# Patient Record
Sex: FEMALE | Race: WHITE | Employment: UNEMPLOYED | URBAN - METROPOLITAN AREA
[De-identification: names, ages, dates, MRNs, and addresses within clinical notes are randomized per-mention and may not be internally consistent; named-entity substitution may affect disease eponyms.]

---

## 2017-05-06 DIAGNOSIS — N18.30 CKD (CHRONIC KIDNEY DISEASE) STAGE 3, GFR 30-59 ML/MIN (HCC): ICD-10-CM

## 2017-05-06 DIAGNOSIS — I10 ESSENTIAL HYPERTENSION: ICD-10-CM

## 2017-05-06 LAB
ALBUMIN SERPL-MCNC: 4.2 G/DL (ref 3.9–4.9)
ALP BLD-CCNC: 134 U/L (ref 40–130)
ALT SERPL-CCNC: 29 U/L (ref 0–33)
ANION GAP SERPL CALCULATED.3IONS-SCNC: 14 MEQ/L (ref 7–13)
AST SERPL-CCNC: 44 U/L (ref 0–35)
BILIRUB SERPL-MCNC: 0.6 MG/DL (ref 0–1.2)
BUN BLDV-MCNC: 21 MG/DL (ref 8–23)
CALCIUM SERPL-MCNC: 10.2 MG/DL (ref 8.6–10.2)
CHLORIDE BLD-SCNC: 96 MEQ/L (ref 98–107)
CHOLESTEROL, TOTAL: 193 MG/DL (ref 0–199)
CO2: 26 MEQ/L (ref 22–29)
CREAT SERPL-MCNC: 1.15 MG/DL (ref 0.5–0.9)
GFR AFRICAN AMERICAN: 52.9
GFR NON-AFRICAN AMERICAN: 43.7
GLOBULIN: 3.1 G/DL (ref 2.3–3.5)
GLUCOSE BLD-MCNC: 82 MG/DL (ref 74–109)
HDLC SERPL-MCNC: 83 MG/DL (ref 40–59)
LDL CHOLESTEROL CALCULATED: 95 MG/DL (ref 0–129)
POTASSIUM SERPL-SCNC: 4.6 MEQ/L (ref 3.5–5.1)
SODIUM BLD-SCNC: 136 MEQ/L (ref 132–144)
TOTAL PROTEIN: 7.3 G/DL (ref 6.4–8.1)
TRIGL SERPL-MCNC: 77 MG/DL (ref 0–200)

## 2017-05-16 ENCOUNTER — OFFICE VISIT (OUTPATIENT)
Dept: FAMILY MEDICINE CLINIC | Age: 82
End: 2017-05-16

## 2017-05-16 VITALS
BODY MASS INDEX: 18.01 KG/M2 | DIASTOLIC BLOOD PRESSURE: 82 MMHG | WEIGHT: 85.8 LBS | HEART RATE: 72 BPM | OXYGEN SATURATION: 97 % | RESPIRATION RATE: 16 BRPM | SYSTOLIC BLOOD PRESSURE: 174 MMHG | TEMPERATURE: 96.6 F | HEIGHT: 58 IN

## 2017-05-16 DIAGNOSIS — R74.8 ELEVATED LIVER ENZYMES: ICD-10-CM

## 2017-05-16 DIAGNOSIS — I10 ESSENTIAL HYPERTENSION: Primary | ICD-10-CM

## 2017-05-16 DIAGNOSIS — N18.30 CKD (CHRONIC KIDNEY DISEASE) STAGE 3, GFR 30-59 ML/MIN (HCC): ICD-10-CM

## 2017-05-16 PROCEDURE — 99214 OFFICE O/P EST MOD 30 MIN: CPT | Performed by: FAMILY MEDICINE

## 2017-05-16 RX ORDER — MOEXIPRIL HCL 15 MG
15 TABLET ORAL NIGHTLY
Qty: 30 TABLET | Refills: 1 | Status: SHIPPED | OUTPATIENT
Start: 2017-05-16 | End: 2017-07-31 | Stop reason: ALTCHOICE

## 2017-05-16 RX ORDER — KETOCONAZOLE 20 MG/G
CREAM TOPICAL
Refills: 1 | Status: ON HOLD | COMMUNITY
Start: 2017-05-02 | End: 2020-01-01 | Stop reason: HOSPADM

## 2017-05-30 ENCOUNTER — HOSPITAL ENCOUNTER (OUTPATIENT)
Dept: ULTRASOUND IMAGING | Age: 82
Discharge: HOME | End: 2017-05-30
Payer: MEDICARE

## 2017-05-30 DIAGNOSIS — R74.8 ELEVATED LIVER ENZYMES: ICD-10-CM

## 2017-05-30 PROCEDURE — 76705 ECHO EXAM OF ABDOMEN: CPT

## 2017-06-12 DIAGNOSIS — I10 ESSENTIAL HYPERTENSION: ICD-10-CM

## 2017-06-12 RX ORDER — MOEXIPRIL HYDROCHLORIDE AND HYDROCHLOROTHIAZIDE 15; 12.5 MG/1; MG/1
TABLET, FILM COATED ORAL
Qty: 30 TABLET | Refills: 2 | Status: SHIPPED | OUTPATIENT
Start: 2017-06-12 | End: 2017-07-03

## 2017-06-27 DIAGNOSIS — R74.8 ELEVATED LIVER ENZYMES: ICD-10-CM

## 2017-06-27 DIAGNOSIS — N18.30 CKD (CHRONIC KIDNEY DISEASE) STAGE 3, GFR 30-59 ML/MIN (HCC): ICD-10-CM

## 2017-06-27 LAB
ANION GAP SERPL CALCULATED.3IONS-SCNC: 16 MEQ/L (ref 7–13)
BUN BLDV-MCNC: 33 MG/DL (ref 8–23)
CALCIUM SERPL-MCNC: 10 MG/DL (ref 8.6–10.2)
CHLORIDE BLD-SCNC: 101 MEQ/L (ref 98–107)
CO2: 26 MEQ/L (ref 22–29)
CREAT SERPL-MCNC: 1.22 MG/DL (ref 0.5–0.9)
FERRITIN: 180.6 NG/ML (ref 13–150)
GFR AFRICAN AMERICAN: 49.4
GFR NON-AFRICAN AMERICAN: 40.8
GLUCOSE BLD-MCNC: 86 MG/DL (ref 74–109)
HBV SURFACE AB TITR SER: NORMAL MIU/ML
HEPATITIS B SURFACE ANTIGEN INTERPRETATION: NORMAL
HEPATITIS C ANTIBODY INTERPRETATION: NORMAL
POTASSIUM SERPL-SCNC: 4.3 MEQ/L (ref 3.5–5.1)
SODIUM BLD-SCNC: 143 MEQ/L (ref 132–144)

## 2017-06-29 LAB — HAV AB SERPL IA-ACNC: NEGATIVE

## 2017-07-03 ENCOUNTER — OFFICE VISIT (OUTPATIENT)
Dept: FAMILY MEDICINE CLINIC | Age: 82
End: 2017-07-03

## 2017-07-03 VITALS
SYSTOLIC BLOOD PRESSURE: 180 MMHG | WEIGHT: 84.3 LBS | BODY MASS INDEX: 17.7 KG/M2 | RESPIRATION RATE: 15 BRPM | HEART RATE: 76 BPM | TEMPERATURE: 97.8 F | OXYGEN SATURATION: 98 % | HEIGHT: 58 IN | DIASTOLIC BLOOD PRESSURE: 82 MMHG

## 2017-07-03 DIAGNOSIS — R05.9 COUGH: ICD-10-CM

## 2017-07-03 DIAGNOSIS — R74.8 ELEVATED LIVER ENZYMES: ICD-10-CM

## 2017-07-03 DIAGNOSIS — I10 ESSENTIAL HYPERTENSION: Primary | ICD-10-CM

## 2017-07-03 PROCEDURE — 99213 OFFICE O/P EST LOW 20 MIN: CPT | Performed by: FAMILY MEDICINE

## 2017-07-03 RX ORDER — AMLODIPINE BESYLATE 5 MG/1
5 TABLET ORAL DAILY
Qty: 30 TABLET | Refills: 1 | Status: SHIPPED | OUTPATIENT
Start: 2017-07-03 | End: 2017-07-31 | Stop reason: SDUPTHER

## 2017-07-03 ASSESSMENT — ENCOUNTER SYMPTOMS
SHORTNESS OF BREATH: 0
ABDOMINAL PAIN: 0

## 2017-07-31 ENCOUNTER — OFFICE VISIT (OUTPATIENT)
Dept: FAMILY MEDICINE CLINIC | Age: 82
End: 2017-07-31

## 2017-07-31 VITALS
HEART RATE: 87 BPM | HEIGHT: 58 IN | OXYGEN SATURATION: 98 % | BODY MASS INDEX: 17.67 KG/M2 | TEMPERATURE: 97.5 F | WEIGHT: 84.2 LBS | SYSTOLIC BLOOD PRESSURE: 148 MMHG | DIASTOLIC BLOOD PRESSURE: 78 MMHG | RESPIRATION RATE: 16 BRPM

## 2017-07-31 DIAGNOSIS — I10 ESSENTIAL HYPERTENSION: Primary | ICD-10-CM

## 2017-07-31 PROCEDURE — 99213 OFFICE O/P EST LOW 20 MIN: CPT | Performed by: FAMILY MEDICINE

## 2017-07-31 RX ORDER — AMLODIPINE BESYLATE 5 MG/1
5 TABLET ORAL DAILY
Qty: 90 TABLET | Refills: 0 | Status: SHIPPED | OUTPATIENT
Start: 2017-07-31 | End: 2017-10-24 | Stop reason: DRUGHIGH

## 2017-07-31 RX ORDER — ACETAMINOPHEN 500 MG
500 TABLET ORAL EVERY 6 HOURS PRN
Status: ON HOLD | COMMUNITY
End: 2020-01-01 | Stop reason: HOSPADM

## 2017-07-31 ASSESSMENT — ENCOUNTER SYMPTOMS
ABDOMINAL PAIN: 0
SHORTNESS OF BREATH: 0

## 2017-08-22 ENCOUNTER — OFFICE VISIT (OUTPATIENT)
Dept: FAMILY MEDICINE CLINIC | Age: 82
End: 2017-08-22

## 2017-08-22 VITALS
TEMPERATURE: 98 F | HEART RATE: 92 BPM | RESPIRATION RATE: 14 BRPM | SYSTOLIC BLOOD PRESSURE: 144 MMHG | DIASTOLIC BLOOD PRESSURE: 78 MMHG | WEIGHT: 84.7 LBS | HEIGHT: 58 IN | OXYGEN SATURATION: 95 % | BODY MASS INDEX: 17.78 KG/M2

## 2017-08-22 DIAGNOSIS — I10 ESSENTIAL HYPERTENSION: Primary | ICD-10-CM

## 2017-08-22 PROCEDURE — 99212 OFFICE O/P EST SF 10 MIN: CPT | Performed by: FAMILY MEDICINE

## 2017-08-22 ASSESSMENT — ENCOUNTER SYMPTOMS: SHORTNESS OF BREATH: 0

## 2017-09-29 ENCOUNTER — OFFICE VISIT (OUTPATIENT)
Dept: FAMILY MEDICINE CLINIC | Age: 82
End: 2017-09-29

## 2017-09-29 VITALS
RESPIRATION RATE: 15 BRPM | DIASTOLIC BLOOD PRESSURE: 68 MMHG | HEART RATE: 75 BPM | TEMPERATURE: 96.3 F | BODY MASS INDEX: 17.97 KG/M2 | SYSTOLIC BLOOD PRESSURE: 128 MMHG | OXYGEN SATURATION: 96 % | HEIGHT: 58 IN | WEIGHT: 85.6 LBS

## 2017-09-29 DIAGNOSIS — I10 ESSENTIAL HYPERTENSION: Primary | ICD-10-CM

## 2017-09-29 DIAGNOSIS — R35.0 URINARY FREQUENCY: ICD-10-CM

## 2017-09-29 LAB
BILIRUBIN, POC: ABNORMAL
BLOOD URINE, POC: 10
CLARITY, POC: CLEAR
COLOR, POC: YELLOW
GLUCOSE URINE, POC: ABNORMAL
KETONES, POC: ABNORMAL
LEUKOCYTE EST, POC: ABNORMAL
NITRITE, POC: ABNORMAL
PH, POC: 5
PROTEIN, POC: 3
SPECIFIC GRAVITY, POC: 1030
UROBILINOGEN, POC: 3.5

## 2017-09-29 PROCEDURE — 81003 URINALYSIS AUTO W/O SCOPE: CPT | Performed by: FAMILY MEDICINE

## 2017-09-29 PROCEDURE — 99213 OFFICE O/P EST LOW 20 MIN: CPT | Performed by: FAMILY MEDICINE

## 2017-09-29 RX ORDER — METOPROLOL SUCCINATE 25 MG/1
25 TABLET, EXTENDED RELEASE ORAL DAILY
Qty: 90 TABLET | Refills: 0 | Status: SHIPPED | OUTPATIENT
Start: 2017-09-29 | End: 2017-12-10 | Stop reason: SDUPTHER

## 2017-09-29 RX ORDER — AMLODIPINE BESYLATE 10 MG/1
10 TABLET ORAL DAILY
Qty: 90 TABLET | Refills: 1 | Status: CANCELLED | OUTPATIENT
Start: 2017-09-29

## 2017-09-29 RX ORDER — AMLODIPINE BESYLATE 5 MG/1
5 TABLET ORAL DAILY
Qty: 90 TABLET | Refills: 1 | Status: SHIPPED | OUTPATIENT
Start: 2017-09-29 | End: 2018-03-28 | Stop reason: SDUPTHER

## 2017-09-29 RX ORDER — NITROFURANTOIN 25; 75 MG/1; MG/1
100 CAPSULE ORAL 2 TIMES DAILY
Qty: 14 CAPSULE | Refills: 0 | Status: SHIPPED | OUTPATIENT
Start: 2017-09-29 | End: 2017-10-06

## 2017-09-29 ASSESSMENT — ENCOUNTER SYMPTOMS: ABDOMINAL PAIN: 0

## 2017-10-01 LAB — URINE CULTURE, ROUTINE: NORMAL

## 2017-10-02 ENCOUNTER — TELEPHONE (OUTPATIENT)
Dept: FAMILY MEDICINE CLINIC | Age: 82
End: 2017-10-02

## 2017-10-02 NOTE — TELEPHONE ENCOUNTER
Patient states Friday she was given a script of nitrofurantion . She states she thinks she is having a reaction from it because  has never had this reaction before.  She states she is having coughing spells that hurts, and she does ot think its normal. She wants to know if its normal

## 2017-10-09 DIAGNOSIS — R31.9 BLOOD IN URINE: Primary | ICD-10-CM

## 2017-10-23 ENCOUNTER — OFFICE VISIT (OUTPATIENT)
Dept: UROLOGY | Age: 82
End: 2017-10-23

## 2017-10-23 VITALS
DIASTOLIC BLOOD PRESSURE: 68 MMHG | HEART RATE: 64 BPM | HEIGHT: 59 IN | BODY MASS INDEX: 16.93 KG/M2 | WEIGHT: 84 LBS | SYSTOLIC BLOOD PRESSURE: 130 MMHG

## 2017-10-23 DIAGNOSIS — R31.9 HEMATURIA, UNSPECIFIED TYPE: Primary | ICD-10-CM

## 2017-10-23 DIAGNOSIS — R31.29 MICROHEMATURIA: ICD-10-CM

## 2017-10-23 LAB
BILIRUBIN, POC: ABNORMAL
BLOOD URINE, POC: ABNORMAL
CLARITY, POC: CLEAR
COLOR, POC: YELLOW
GLUCOSE URINE, POC: ABNORMAL
KETONES, POC: ABNORMAL
LEUKOCYTE EST, POC: ABNORMAL
NITRITE, POC: ABNORMAL
PH, POC: 5
PROTEIN, POC: 30
SPECIFIC GRAVITY, POC: 1.02
UROBILINOGEN, POC: 0.2

## 2017-10-23 PROCEDURE — 99203 OFFICE O/P NEW LOW 30 MIN: CPT | Performed by: UROLOGY

## 2017-10-23 PROCEDURE — 81003 URINALYSIS AUTO W/O SCOPE: CPT | Performed by: UROLOGY

## 2017-10-23 ASSESSMENT — ENCOUNTER SYMPTOMS
GASTROINTESTINAL NEGATIVE: 1
RESPIRATORY NEGATIVE: 1

## 2017-10-23 NOTE — PROGRESS NOTES
 ketoconazole (NIZORAL) 2 % cream APPLY TO AFFECTED AREA AS DIRECTED  1    diclofenac sodium 1 % GEL Apply 2 g topically 4 times daily To affected knee      triamcinolone (KENALOG) 0.1 % cream APPLY DAILY FRIDAY TO SUNDAY TO LEG DERMATITIS  2    Emollient (CERAVE) CREA Apply topically 2 times daily      hydroxychloroquine (PLAQUENIL) 200 MG tablet   5    aspirin 81 MG EC tablet Take 81 mg by mouth daily.  Calcium Carbonate-Vitamin D (CALCIUM + D PO) Take  by mouth every other day.  Wheat Dextrin (BENEFIBER PO) Take  by mouth daily. 1 tsp       amLODIPine (NORVASC) 5 MG tablet Take 1 tablet by mouth daily 90 tablet 1     No current facility-administered medications for this visit. Norvasc [amlodipine] and Univasc [moexipril]  reviewed      Review of Systems   Constitutional: Negative. HENT: Negative. Eyes: Positive for visual disturbance. Respiratory: Negative. Cardiovascular: Negative. Gastrointestinal: Negative. Endocrine: Negative. Genitourinary: Negative for decreased urine volume, difficulty urinating, dysuria, enuresis, flank pain, frequency, genital sores, pelvic pain and urgency. Musculoskeletal: Negative. Skin: Negative. Neurological: Negative. Hematological: Negative. Does not bruise/bleed easily. Psychiatric/Behavioral: Negative. Objective:   Physical Exam   Constitutional: She appears well-developed and well-nourished. HENT:   Head: Normocephalic and atraumatic. Eyes: Conjunctivae are normal.   Neck: Neck supple. No tracheal deviation present. No thyromegaly present. Cardiovascular: Normal rate, regular rhythm and normal heart sounds. Pulmonary/Chest: Effort normal and breath sounds normal. She has no wheezes. She has no rales. Abdominal: Soft. Bowel sounds are normal. She exhibits no distension and no mass. There is no hepatosplenomegaly. There is no tenderness. There is no rebound, no guarding and no CVA tenderness.  No hernia. Neurological: She is alert. Skin: Skin is warm. Psychiatric: She has a normal mood and affect. Her behavior is normal.       10/1/2017  7:38 AM - Dilip, Chpo Incoming Lab Results From [facility]     Component Results     Component Collected Lab   Urine Culture, Routine 09/29/2017  2:32 PM [address] [facility]   No growth 24 hours    Testing Performed By     Lab - [address] Name Director Address Valid Date Range   148[address] [facility] Nubia Melton MD [address] [place] [address] 08/30/17 1247-Present   Narrative     ORDER#: [id number]                          ORDERED BY: Angela Pike  SOURCE: Urine Clean Catch                  COLLECTED:  09/29/17 14:32  [facility] AT [facility]. :                      RECEIVED :  09/29/17 20:11   Lab and Collection     9/29/2017  1:28 PM - Graciela Harry MA     Component Results     Component Collected Lab   Color, UA 09/29/2017  1:28 PM Unknown   yellow    Clarity, UA 09/29/2017  1:28 PM Unknown   clear    Glucose, UA POC 09/29/2017  1:28 PM Unknown   n    Bilirubin, UA 09/29/2017  1:28 PM Unknown   n    Ketones, UA 09/29/2017  1:28 PM Unknown   n    Spec Grav, UA 09/29/2017  1:28 PM Unknown   1,030    Blood, UA POC 09/29/2017  1:28 PM Unknown   10    pH, UA 09/29/2017  1:28 PM Unknown   5    Protein, UA POC 09/29/2017  1:28 PM Unknown   3    Urobilinogen, UA 09/29/2017  1:28 PM Unknown   3.5    Leukocytes, UA 09/29/2017  1:28 PM Unknown   n    Nitrite, UA 09/29/2017  1:28 PM Unknown   n      6/27/2017  5:58 PM - Dilip, Chpo Incoming Lab Results From [facility]     Component Results     Component Value Ref Range & Units Status Collected Lab   Sodium 143  132 - 144 mEq/L Final 06/27/2017 11:18 AM [facility]   Potassium 4.3  3.5 - 5.1 mEq/L Final 06/27/2017 11:18 AM [facility]   Chloride 101  98 - 107 mEq/L Final 06/27/2017 11:18 AM [facility]   CO2 26  22 - 29 mEq/L Final 06/27/2017 11:18 AM [facility]   Anion Gap 16   7 - 13 mEq/L Final 06/27/2017 11:18 AM [facility]   Glucose 86  74 - 109 mg/dL Final 06/27/2017 11:18 AM [facility]   BUN 33   8 - 23 mg/dL Final 06/27/2017 11:18 AM [facility]   CREATININE 1.22   0.50 - 0.90 mg/dL Final 06/27/2017 11:18 AM [facility]   GFR Non- 40.8   >60 Final 06/27/2017 11:18 AM [facility]   >60 mL/min/1.73m2 EGFR, calc. for ages 25 and older using the   MDRD formula (not corrected for weight), is valid for stable   renal function. GFR  49.4   >60 Final 06/27/2017 11:18 AM [facility]   >60 mL/min/1.73m2 EGFR, calc. for ages 25 and older using the   MDRD formula (not corrected for weight), is valid for stable   renal function. Calcium 10.0  8.6        10/23/2017  1:40 PM - Denver Stump     Component Results     Component Collected Lab   Color, UA 10/23/2017  1:40 PM Unknown   yellow    Clarity, UA 10/23/2017  1:40 PM Unknown   clear    Glucose, UA POC 10/23/2017  1:40 PM Unknown   neg    Bilirubin, UA 10/23/2017  1:40 PM Unknown   neg    Ketones, UA 10/23/2017  1:40 PM Unknown   neg    Spec Grav, UA 10/23/2017  1:40 PM Unknown   1.025    Blood, UA POC 10/23/2017  1:40 PM Unknown   trace-lysed    pH, UA 10/23/2017  1:40 PM Unknown   5.0    Protein, UA POC 10/23/2017  1:40 PM Unknown   30    Urobilinogen, UA 10/23/2017  1:40 PM Unknown   0.2    Leukocytes, UA 10/23/2017  1:40 PM Unknown   neg    Nitrite, UA 10/23/2017  1:40 PM Unknown   neg        Assessment: This is a 81 yo female with h/o HTN, OA, CKD (last Creat 1.22) with persistent microhematuria. I discussed a hematuria evaluation with the patient and her daughter today. She is not a candidate for a CT Urogram given elevated Creat but they would prefer a Renal U/S and office cystoscopy and understand the limitations of the U/S when is comes to upper tract evaluation. I think this is reasonable given her age and not having gross hematuria. They will agree to further imaging if the U/S suggests the need for this.  They want to wait

## 2017-10-24 ENCOUNTER — OFFICE VISIT (OUTPATIENT)
Dept: FAMILY MEDICINE CLINIC | Age: 82
End: 2017-10-24

## 2017-10-24 VITALS
SYSTOLIC BLOOD PRESSURE: 136 MMHG | BODY MASS INDEX: 17.71 KG/M2 | WEIGHT: 84.4 LBS | OXYGEN SATURATION: 97 % | DIASTOLIC BLOOD PRESSURE: 78 MMHG | HEART RATE: 62 BPM | HEIGHT: 58 IN | TEMPERATURE: 96.7 F | RESPIRATION RATE: 14 BRPM

## 2017-10-24 DIAGNOSIS — I10 ESSENTIAL HYPERTENSION: Primary | ICD-10-CM

## 2017-10-24 PROCEDURE — 99212 OFFICE O/P EST SF 10 MIN: CPT | Performed by: FAMILY MEDICINE

## 2017-10-24 NOTE — PROGRESS NOTES
Subjective:      Patient ID: Gallo Padilla is a 80 y.o. female    HPI  Here in follow up for htn. Has noted less swelling of legs since decreasing norvasc dose   Review of Systems   Constitutional: Negative for unexpected weight change. Skin: Negative for rash. Neurological: Negative for dizziness. Reviewed allergy, medical, social, surgical, family and med list changes and updated   Files  Social History     Social History    Marital status:      Spouse name: N/A    Number of children: N/A    Years of education: N/A     Social History Main Topics    Smoking status: Never Smoker    Smokeless tobacco: Never Used    Alcohol use Yes      Comment: SOCIALLY    Drug use: No    Sexual activity: No     Other Topics Concern    None     Social History Narrative    None     Current Outpatient Prescriptions   Medication Sig Dispense Refill    amLODIPine (NORVASC) 5 MG tablet Take 1 tablet by mouth daily 90 tablet 1    metoprolol succinate (TOPROL XL) 25 MG extended release tablet Take 1 tablet by mouth daily 90 tablet 0    Multiple Vitamins-Minerals (MULTIVITAMIN WOMEN PO) Take by mouth      acetaminophen (TYLENOL) 500 MG tablet Take 500 mg by mouth every 6 hours as needed for Pain      ketoconazole (NIZORAL) 2 % cream APPLY TO AFFECTED AREA AS DIRECTED  1    diclofenac sodium 1 % GEL Apply 2 g topically 4 times daily To affected knee      triamcinolone (KENALOG) 0.1 % cream APPLY DAILY FRIDAY TO SUNDAY TO LEG DERMATITIS  2    Emollient (CERAVE) CREA Apply topically 2 times daily      hydroxychloroquine (PLAQUENIL) 200 MG tablet   5    aspirin 81 MG EC tablet Take 81 mg by mouth daily.  Calcium Carbonate-Vitamin D (CALCIUM + D PO) Take  by mouth every other day.  Wheat Dextrin (BENEFIBER PO) Take  by mouth daily. 1 tsp        No current facility-administered medications for this visit. History reviewed. No pertinent family history.   Past Medical History:   Diagnosis Date    Cancer ([address]     H/O SKIN,TWO REMOVED FROM NOSE,MOLES ALL OVER BODY    Elevated liver enzymes     History of    Hypertension     Osteoarthritis      Objective:   /78 (Site: Left Arm, Position: Sitting, Cuff Size: Child)   Pulse 62   Temp 96.7 °F (35.9 °C) (Tympanic)   Resp 14   Ht 4' 9.5\" (1.461 m)   Wt 84 lb 6.4 oz (38.3 kg)   SpO2 97%   BMI 17.95 kg/m²     Physical Exam    Lungs:clear and equal breath sounds. No wheezes or rales. Heart:rate reg. No murmur. No gallops   l  Extremities: trace edema of both legs   Gen: In no acute distress    Assessment:         1.  Essential hypertension        Plan:    continue current medications   F/u after fasting blood work in 6 months

## 2017-11-27 ENCOUNTER — HOSPITAL ENCOUNTER (OUTPATIENT)
Dept: ULTRASOUND IMAGING | Age: 82
Discharge: HOME | End: 2017-11-27
Payer: MEDICARE

## 2017-11-27 DIAGNOSIS — R31.29 MICROHEMATURIA: ICD-10-CM

## 2017-11-27 PROCEDURE — 76775 US EXAM ABDO BACK WALL LIM: CPT

## 2017-11-30 ENCOUNTER — PROCEDURE VISIT (OUTPATIENT)
Dept: UROLOGY | Age: 82
End: 2017-11-30

## 2017-11-30 VITALS
SYSTOLIC BLOOD PRESSURE: 120 MMHG | HEART RATE: 76 BPM | HEIGHT: 58 IN | DIASTOLIC BLOOD PRESSURE: 64 MMHG | WEIGHT: 85 LBS | BODY MASS INDEX: 17.84 KG/M2

## 2017-11-30 DIAGNOSIS — R31.29 MICROSCOPIC HEMATURIA: Primary | ICD-10-CM

## 2017-11-30 LAB
BILIRUBIN, POC: NORMAL
BLOOD URINE, POC: NORMAL
CLARITY, POC: CLEAR
COLOR, POC: YELLOW
GLUCOSE URINE, POC: NORMAL
KETONES, POC: NORMAL
LEUKOCYTE EST, POC: NORMAL
NITRITE, POC: NORMAL
PH, POC: 5
PROTEIN, POC: 30
SPECIFIC GRAVITY, POC: >1.03
UROBILINOGEN, POC: 0.2

## 2017-11-30 PROCEDURE — 99213 OFFICE O/P EST LOW 20 MIN: CPT | Performed by: UROLOGY

## 2017-11-30 PROCEDURE — 52000 CYSTOURETHROSCOPY: CPT | Performed by: UROLOGY

## 2017-11-30 PROCEDURE — 81003 URINALYSIS AUTO W/O SCOPE: CPT | Performed by: UROLOGY

## 2017-11-30 RX ORDER — DOXYCYCLINE HYCLATE 100 MG/1
100 CAPSULE ORAL ONCE
Qty: 1 CAPSULE | Refills: 0 | COMMUNITY
Start: 2017-11-30 | End: 2017-11-30

## 2017-11-30 ASSESSMENT — ENCOUNTER SYMPTOMS
ABDOMINAL DISTENTION: 0
ABDOMINAL PAIN: 0

## 2017-11-30 NOTE — PROGRESS NOTES
Subjective:      Patient ID: lAexandru Elliott is a 80 y.o. female. HPI This is a 79 yo female with h/o HTN, OA, CKD (last Creat 1.22) back for evaluation of microhematuria. She denies gross hematuria. She does notice some bloody discharge on her toilet paper after voiding occasionally for the last several months. She has no dysuria, no UTI's or kidney stones. She has no abd or flank pain. I reviewed the interval renal U/S. The patient and her daughter did not want IV dye. She wants to proceed with cystoscopy today. Past Medical History:   Diagnosis Date    Cancer ([facility] [address]     H/O SKIN,TWO REMOVED FROM NOSE,MOLES ALL OVER BODY    Elevated liver enzymes     History of    Hypertension     Osteoarthritis      Past Surgical History:   Procedure Laterality Date    CATARACT REMOVAL WITH IMPLANT  8/18/14    DR. MATT    CATARACT REMOVAL WITH IMPLANT  9/10/14      [address] [place] [address] FRACTURE SURGERY  2008    LEFT WRIST-REDUCTION    TONSILLECTOMY       Social History     Social History    Marital status:      Spouse name: N/A    Number of children: N/A    Years of education: N/A     Social History Main Topics    Smoking status: Never Smoker    Smokeless tobacco: Never Used    Alcohol use Yes      Comment: SOCIALLY    Drug use: No    Sexual activity: No     Other Topics Concern    None     Social History Narrative    None     History reviewed. No pertinent family history.   Current Outpatient Prescriptions   Medication Sig Dispense Refill    amLODIPine (NORVASC) 5 MG tablet Take 1 tablet by mouth daily 90 tablet 1    metoprolol succinate (TOPROL XL) 25 MG extended release tablet Take 1 tablet by mouth daily 90 tablet 0    Multiple Vitamins-Minerals (MULTIVITAMIN WOMEN PO) Take by mouth      acetaminophen (TYLENOL) 500 MG tablet Take 500 mg by mouth every 6 hours as needed for Pain      ketoconazole (NIZORAL) 2 % cream APPLY TO AFFECTED AREA AS DIRECTED  1    diclofenac sodium 1 % GEL Apply 2 g CLINICAL HISTORY: Microhematuria       FINDINGS: Multiple scans performed bilaterally shows no significant mass in either kidney. No definite calculi seen.       There is slight prominence the proximal right ureter, 8 mm in diameter, but no intraluminal stone or mass lesion seen. Nevertheless, obstructing lesion not excluded.       No hydronephrosis on the left side.       Right kidney 9.1 x 4.1 x 3.2 cm. Left kidney 9.4 x 4.9 x 3.2 cm.           Impression   DILATED PROXIMAL RIGHT URETER OF UNCERTAIN SIGNIFICANCE. OBSTRUCTING LESION NOT EXCLUDED. Cystoscopy Procedure Note    Pre-operative Diagnosis: Microhematuria    Post-operative Diagnosis:  Same     Surgeon: Stewart Blanco     Assistants: [facility]. RAJIV Guevara    Anesthesia: Local anesthesia topical 2% lidocaine gel    Procedure Details   The risks, benefits, complications, treatment options, and expected outcomes were discussed with the patient. The patient concurred with the proposed plan, giving informed consent. Cystoscopy was performed today under local anesthesia, using sterile technique. The patient was placed in the supine position, prepped and draped in the usual sterile fashion. A flexible cystoscope was used to inspect the entire bladder including retroflexion. Findings:  Urethra:normal  Bladder: there is a 1 cm area in the posterior base of the bladder that appears inflammatory with erythema and slightly raised. There are no papillary tumors, stones clots or FB or other abnl. Ureteral orifice(s) were normal . Ureteral orifice(s) were in the normal location. Specimens: None                 Complications:  None; patient tolerated the procedure well. Disposition: To home. Condition: stable    Assessment: This is a 81 yo female with h/o HTN, OA, CKD (last Creat 1.22) with persistent microhematuria and possible asymptomatic Rt ureteral dilation and area in posterior bladder that appears inflammatory.  I discussed a CT without contrast for evaluation of U/S findings and they want to proceed. I also discussed option of bladder biopsy and fulguration (risks and benefits reviewed) but will obtain a cytology prior. May be able to repeat cysto in a few months and if area still persists than consider a biopsy. They would like to avoid invasive testing given her age which is reasonable. Plan:      1. CT ABD/Pelvis without ctx  2. Cytology  3. F/U 2 weeks to discuss above and consider further evaluation  4.  Doxycycline 100 mg po x one

## 2017-12-11 ENCOUNTER — HOSPITAL ENCOUNTER (OUTPATIENT)
Dept: CT IMAGING | Age: 82
Discharge: HOME | End: 2017-12-11
Payer: MEDICARE

## 2017-12-11 DIAGNOSIS — R31.29 MICROSCOPIC HEMATURIA: ICD-10-CM

## 2017-12-11 PROCEDURE — 74176 CT ABD & PELVIS W/O CONTRAST: CPT

## 2017-12-11 RX ORDER — METOPROLOL SUCCINATE 25 MG/1
TABLET, EXTENDED RELEASE ORAL
Qty: 90 TABLET | Refills: 0 | Status: SHIPPED | OUTPATIENT
Start: 2017-12-11 | End: 2018-03-11 | Stop reason: SDUPTHER

## 2017-12-14 ENCOUNTER — OFFICE VISIT (OUTPATIENT)
Dept: UROLOGY | Age: 82
End: 2017-12-14

## 2017-12-14 VITALS
BODY MASS INDEX: 17.42 KG/M2 | SYSTOLIC BLOOD PRESSURE: 120 MMHG | HEART RATE: 61 BPM | WEIGHT: 83 LBS | HEIGHT: 58 IN | DIASTOLIC BLOOD PRESSURE: 64 MMHG

## 2017-12-14 DIAGNOSIS — R31.29 MICROHEMATURIA: Primary | ICD-10-CM

## 2017-12-14 PROCEDURE — 99213 OFFICE O/P EST LOW 20 MIN: CPT | Performed by: UROLOGY

## 2017-12-14 ASSESSMENT — ENCOUNTER SYMPTOMS
ABDOMINAL DISTENTION: 0
SHORTNESS OF BREATH: 0
ABDOMINAL PAIN: 0

## 2017-12-14 NOTE — PROGRESS NOTES
Subjective:      Patient ID: Lani High is a 80 y.o. female. HPI  This is a 79 yo female with h/o HTN, OA, CKD (last Creat 1.22) back with microhematuria. When last seen she had a Renal U/S that suggested possible Rt ureteral dilation and had cystoscopy which showed a slightly raised and red lesion in the posterior bladder wall. Since last seen she denies gross hematuria. She has no dysuria and no abd or flank pain. I reviewed the interval Ct report today with the patient and daughter and there is no ureteral dilation. She ha sno other complaints. Past Medical History:   Diagnosis Date    Cancer ([facility] [address]     H/O SKIN,TWO REMOVED FROM NOSE,MOLES ALL OVER BODY    Elevated liver enzymes     History of    Hypertension     Osteoarthritis      Past Surgical History:   Procedure Laterality Date    CATARACT REMOVAL WITH IMPLANT  8/18/14    DR. MATT    CATARACT REMOVAL WITH IMPLANT  9/10/14      [address] [place] [address] FRACTURE SURGERY  2008    LEFT WRIST-REDUCTION    TONSILLECTOMY       Social History     Social History    Marital status:      Spouse name: N/A    Number of children: N/A    Years of education: N/A     Social History Main Topics    Smoking status: Never Smoker    Smokeless tobacco: Never Used    Alcohol use Yes      Comment: SOCIALLY    Drug use: No    Sexual activity: No     Other Topics Concern    None     Social History Narrative    None     History reviewed. No pertinent family history.   Current Outpatient Prescriptions   Medication Sig Dispense Refill    metoprolol succinate (TOPROL XL) 25 MG extended release tablet TAKE 1 TABLET DAILY 90 tablet 0    amLODIPine (NORVASC) 5 MG tablet Take 1 tablet by mouth daily 90 tablet 1    Multiple Vitamins-Minerals (MULTIVITAMIN WOMEN PO) Take by mouth      acetaminophen (TYLENOL) 500 MG tablet Take 500 mg by mouth every 6 hours as needed for Pain      ketoconazole (NIZORAL) 2 % cream APPLY TO AFFECTED AREA AS DIRECTED  1    diclofenac and they want the latter. The patient and her daughter understand the uncertainty of the finding on cystoscopy and that it could represent a bladder cancer that is not being diagnosed and could lead to morbidity. Plan:      1. F/U 2 mo for office cystoscopy, local  2.  Will call for cystology results

## 2017-12-21 ENCOUNTER — TELEPHONE (OUTPATIENT)
Dept: UROLOGY | Age: 82
End: 2017-12-21

## 2018-01-19 ENCOUNTER — OFFICE VISIT (OUTPATIENT)
Dept: UROLOGY | Age: 83
End: 2018-01-19

## 2018-01-19 VITALS
DIASTOLIC BLOOD PRESSURE: 60 MMHG | WEIGHT: 85 LBS | BODY MASS INDEX: 17.84 KG/M2 | HEART RATE: 84 BPM | SYSTOLIC BLOOD PRESSURE: 100 MMHG | HEIGHT: 58 IN

## 2018-01-19 DIAGNOSIS — R31.21 ASYMPTOMATIC MICROSCOPIC HEMATURIA: Primary | ICD-10-CM

## 2018-01-19 PROCEDURE — 99214 OFFICE O/P EST MOD 30 MIN: CPT | Performed by: UROLOGY

## 2018-01-19 ASSESSMENT — ENCOUNTER SYMPTOMS
ABDOMINAL PAIN: 0
ABDOMINAL DISTENTION: 0

## 2018-01-19 NOTE — PROGRESS NOTES
APPLY TO AFFECTED AREA AS DIRECTED  1    diclofenac sodium 1 % GEL Apply 2 g topically 4 times daily To affected knee      triamcinolone (KENALOG) 0.1 % cream APPLY DAILY FRIDAY TO  TO LEG DERMATITIS  2    Emollient (CERAVE) CREA Apply topically 2 times daily      hydroxychloroquine (PLAQUENIL) 200 MG tablet   5    aspirin 81 MG EC tablet Take 81 mg by mouth daily.  Calcium Carbonate-Vitamin D (CALCIUM + D PO) Take  by mouth every other day.  Wheat Dextrin (BENEFIBER PO) Take  by mouth daily. 1 tsp        No current facility-administered medications for this visit. Norvasc [amlodipine] and Univasc [moexipril]  reviewed      Review of Systems   Constitutional: Negative for fever. Gastrointestinal: Negative for abdominal distention and abdominal pain. Genitourinary: Negative for difficulty urinating, dysuria, flank pain and hematuria. Objective:   Physical Exam   Constitutional: She appears well-developed and well-nourished. Abdominal: Soft. She exhibits no distension. There is no tenderness. There is no rebound, no guarding and no CVA tenderness. Neurological: She is alert. Psychiatric: She has a normal mood and affect.  Her behavior is normal.       2017 10:02 AM - Silvia Cherry Incoming Lab Results From [address]  [address]                                       [phone]  FINAL CYTOLOGY REPORT  Patient Name: Jaquelin May                 Accession No:  [id number]   Age Sex:   1921 89 Y/ F           Location:      [facility]  Account No:   [de-identified]                  Collected:     2017  Med Rec No:    [id number]                    Received:      2017  Attend Phys:   PRERNA MURO             Completed      2017  Perform Phys: Jazmin Smith

## 2018-01-30 ENCOUNTER — OFFICE VISIT (OUTPATIENT)
Dept: FAMILY MEDICINE CLINIC | Age: 83
End: 2018-01-30
Payer: MEDICARE

## 2018-01-30 VITALS
WEIGHT: 86.38 LBS | TEMPERATURE: 98 F | RESPIRATION RATE: 14 BRPM | OXYGEN SATURATION: 99 % | HEART RATE: 88 BPM | HEIGHT: 58 IN | DIASTOLIC BLOOD PRESSURE: 70 MMHG | BODY MASS INDEX: 18.13 KG/M2 | SYSTOLIC BLOOD PRESSURE: 120 MMHG

## 2018-01-30 DIAGNOSIS — M54.50 ACUTE MIDLINE LOW BACK PAIN WITHOUT SCIATICA: Primary | ICD-10-CM

## 2018-01-30 PROCEDURE — 99213 OFFICE O/P EST LOW 20 MIN: CPT | Performed by: FAMILY MEDICINE

## 2018-01-30 PROCEDURE — 3288F FALL RISK ASSESSMENT DOCD: CPT | Performed by: FAMILY MEDICINE

## 2018-01-30 PROCEDURE — G8510 SCR DEP NEG, NO PLAN REQD: HCPCS | Performed by: FAMILY MEDICINE

## 2018-01-30 RX ORDER — PREDNISONE 10 MG/1
TABLET ORAL
Qty: 30 TABLET | Refills: 0 | Status: SHIPPED | OUTPATIENT
Start: 2018-01-30 | End: 2018-08-03

## 2018-01-30 RX ORDER — TRAMADOL HYDROCHLORIDE 50 MG/1
50 TABLET ORAL EVERY 6 HOURS PRN
Qty: 28 TABLET | Refills: 0 | Status: SHIPPED | OUTPATIENT
Start: 2018-01-30 | End: 2018-02-06

## 2018-01-30 ASSESSMENT — PATIENT HEALTH QUESTIONNAIRE - PHQ9
SUM OF ALL RESPONSES TO PHQ QUESTIONS 1-9: 0
2. FEELING DOWN, DEPRESSED OR HOPELESS: 0
SUM OF ALL RESPONSES TO PHQ9 QUESTIONS 1 & 2: 0
1. LITTLE INTEREST OR PLEASURE IN DOING THINGS: 0

## 2018-01-30 ASSESSMENT — ENCOUNTER SYMPTOMS: ABDOMINAL PAIN: 0

## 2018-01-30 NOTE — PROGRESS NOTES
Subjective:      Patient ID: Tracie Cadena is a 80 y.o. female    HPI  Here with worsening lower back pain over the last week or so. Here with family. No injury. Pain occasionally goes down legs. No numbness of legs. Has started to use walker recently    Review of Systems   Constitutional: Positive for activity change. Negative for unexpected weight change. Gastrointestinal: Negative for abdominal pain. Musculoskeletal: Positive for arthralgias. Negative for joint swelling. Skin: Negative for rash. Neurological: Negative for weakness. Reviewed allergy, medical, social, surgical, family and med list changes and updated   Files  Social History     Social History    Marital status:      Spouse name: N/A    Number of children: N/A    Years of education: N/A     Social History Main Topics    Smoking status: Never Smoker    Smokeless tobacco: Never Used    Alcohol use Yes      Comment: SOCIALLY    Drug use: No    Sexual activity: No     Other Topics Concern    None     Social History Narrative    None     Current Outpatient Prescriptions   Medication Sig Dispense Refill    metoprolol succinate (TOPROL XL) 25 MG extended release tablet TAKE 1 TABLET DAILY 90 tablet 0    amLODIPine (NORVASC) 5 MG tablet Take 1 tablet by mouth daily 90 tablet 1    Multiple Vitamins-Minerals (MULTIVITAMIN WOMEN PO) Take by mouth      acetaminophen (TYLENOL) 500 MG tablet Take 500 mg by mouth every 6 hours as needed for Pain      ketoconazole (NIZORAL) 2 % cream APPLY TO AFFECTED AREA AS DIRECTED  1    diclofenac sodium 1 % GEL Apply 2 g topically 4 times daily To affected knee      triamcinolone (KENALOG) 0.1 % cream APPLY DAILY FRIDAY TO SUNDAY TO LEG DERMATITIS  2    Emollient (CERAVE) CREA Apply topically 2 times daily      hydroxychloroquine (PLAQUENIL) 200 MG tablet   5    aspirin 81 MG EC tablet Take 81 mg by mouth daily.         Calcium Carbonate-Vitamin D (CALCIUM + D PO) Take  by mouth every other day.  Wheat Dextrin (BENEFIBER PO) Take  by mouth daily. 1 tsp        No current facility-administered medications for this visit. History reviewed. No pertinent family history. Past Medical History:   Diagnosis Date    Cancer ([address]     H/O SKIN,TWO REMOVED FROM NOSE,MOLES ALL OVER BODY    Elevated liver enzymes     History of    Hypertension     Osteoarthritis    xray with multi level spurring and ddd  Objective:   /70 (Site: Right Arm, Position: Sitting, Cuff Size: Large Adult)   Pulse 88   Temp 98 °F (36.7 °C)   Resp 14   Ht 4' 10\" (1.473 m)   Wt 86 lb 6 oz (39.2 kg)   SpO2 99%   BMI 18.05 kg/m²     Physical Exam     Mild  Tenderness at L-S junction                                           No Paraspinal spasm                                  No  CVA tenderness   Neuro:       Patellar  L   Not able to illicit      Ankle   L  Not able to illicit                     Patellar  R  Not able to illicit      Ankle   R  Not able to illicit                     Dorsi/Plantar flexion -extension   R  5/5                     Dorsi/Plantar flexion- extension   L  5/5                    Quad strength    R  5/5                     Quad strength    L  5/5  Pulses:    D.P.  Pulses   Just palpable and equal   Extremities:  No edema of either leg  Lung:  Clear and equal breath sounds bilaterally. No wheezes or rales  Heart:   Rate reg. 1/6 syst murmur along llsb   Assessment:      1.  Acute midline low back pain without sciatica           Plan:      Orders Placed This Encounter   Procedures    XR LUMBAR SPINE (MIN 4 VIEWS)     Standing Status:   Future     Standing Expiration Date:   2019     Orders Placed This Encounter   Medications    predniSONE (DELTASONE) 10 MG tablet     Simg daily x 4d, 30mg daily x 3d, 20mg x 2d, 10mg x 1d, then d/c     Dispense:  30 tablet     Refill:  0    traMADol (ULTRAM) 50 MG tablet     Sig: Take 1 tablet by mouth every 6 hours as needed for Pain for up

## 2018-02-01 ENCOUNTER — TELEPHONE (OUTPATIENT)
Dept: FAMILY MEDICINE CLINIC | Age: 83
End: 2018-02-01

## 2018-02-07 ENCOUNTER — HOSPITAL ENCOUNTER (OUTPATIENT)
Dept: MRI IMAGING | Age: 83
Discharge: HOME | End: 2018-02-09
Payer: MEDICARE

## 2018-02-07 DIAGNOSIS — M54.50 ACUTE MIDLINE LOW BACK PAIN WITHOUT SCIATICA: ICD-10-CM

## 2018-02-07 PROCEDURE — 72148 MRI LUMBAR SPINE W/O DYE: CPT

## 2018-02-13 ENCOUNTER — OFFICE VISIT (OUTPATIENT)
Dept: FAMILY MEDICINE CLINIC | Age: 83
End: 2018-02-13
Payer: MEDICARE

## 2018-02-13 VITALS
WEIGHT: 84 LBS | HEART RATE: 73 BPM | SYSTOLIC BLOOD PRESSURE: 120 MMHG | RESPIRATION RATE: 12 BRPM | DIASTOLIC BLOOD PRESSURE: 70 MMHG | TEMPERATURE: 98.3 F | OXYGEN SATURATION: 99 % | HEIGHT: 58 IN | BODY MASS INDEX: 17.63 KG/M2

## 2018-02-13 DIAGNOSIS — M47.816 LUMBAR SPONDYLOSIS: Primary | ICD-10-CM

## 2018-02-13 DIAGNOSIS — Z78.0 POST-MENOPAUSAL: ICD-10-CM

## 2018-02-13 PROCEDURE — 99212 OFFICE O/P EST SF 10 MIN: CPT | Performed by: FAMILY MEDICINE

## 2018-03-02 PROBLEM — M47.816 SPONDYLOSIS OF LUMBAR REGION WITHOUT MYELOPATHY OR RADICULOPATHY: Status: ACTIVE | Noted: 2018-03-02

## 2018-03-12 RX ORDER — METOPROLOL SUCCINATE 25 MG/1
TABLET, EXTENDED RELEASE ORAL
Qty: 90 TABLET | Refills: 0 | Status: SHIPPED | OUTPATIENT
Start: 2018-03-12 | End: 2018-03-20 | Stop reason: SDUPTHER

## 2018-03-20 RX ORDER — METOPROLOL SUCCINATE 25 MG/1
TABLET, EXTENDED RELEASE ORAL
Qty: 90 TABLET | Refills: 0 | Status: SHIPPED | OUTPATIENT
Start: 2018-03-20 | End: 2018-08-03

## 2018-04-20 ENCOUNTER — TELEPHONE (OUTPATIENT)
Dept: FAMILY MEDICINE CLINIC | Age: 83
End: 2018-04-20

## 2018-04-23 ENCOUNTER — OFFICE VISIT (OUTPATIENT)
Dept: FAMILY MEDICINE CLINIC | Age: 83
End: 2018-04-23
Payer: MEDICARE

## 2018-04-23 VITALS
SYSTOLIC BLOOD PRESSURE: 120 MMHG | RESPIRATION RATE: 16 BRPM | WEIGHT: 89.2 LBS | HEART RATE: 60 BPM | OXYGEN SATURATION: 97 % | HEIGHT: 60 IN | TEMPERATURE: 98.6 F | BODY MASS INDEX: 17.51 KG/M2 | DIASTOLIC BLOOD PRESSURE: 80 MMHG

## 2018-04-23 DIAGNOSIS — R60.0 BILATERAL LEG EDEMA: ICD-10-CM

## 2018-04-23 DIAGNOSIS — I10 ESSENTIAL HYPERTENSION: Primary | ICD-10-CM

## 2018-04-23 PROCEDURE — 99213 OFFICE O/P EST LOW 20 MIN: CPT | Performed by: FAMILY MEDICINE

## 2018-04-23 ASSESSMENT — ENCOUNTER SYMPTOMS
SHORTNESS OF BREATH: 0
ABDOMINAL PAIN: 0

## 2018-05-31 ENCOUNTER — OFFICE VISIT (OUTPATIENT)
Dept: FAMILY MEDICINE CLINIC | Age: 83
End: 2018-05-31
Payer: MEDICARE

## 2018-05-31 VITALS
HEART RATE: 60 BPM | HEIGHT: 58 IN | BODY MASS INDEX: 18.14 KG/M2 | DIASTOLIC BLOOD PRESSURE: 70 MMHG | RESPIRATION RATE: 14 BRPM | OXYGEN SATURATION: 98 % | WEIGHT: 86.4 LBS | TEMPERATURE: 97.6 F | SYSTOLIC BLOOD PRESSURE: 158 MMHG

## 2018-05-31 DIAGNOSIS — I10 ESSENTIAL HYPERTENSION: Primary | ICD-10-CM

## 2018-05-31 PROCEDURE — 99213 OFFICE O/P EST LOW 20 MIN: CPT | Performed by: FAMILY MEDICINE

## 2018-05-31 RX ORDER — METOPROLOL SUCCINATE 50 MG/1
50 TABLET, EXTENDED RELEASE ORAL DAILY
Qty: 90 TABLET | Refills: 0 | Status: SHIPPED | OUTPATIENT
Start: 2018-05-31 | End: 2018-08-12 | Stop reason: SDUPTHER

## 2018-05-31 ASSESSMENT — ENCOUNTER SYMPTOMS: SHORTNESS OF BREATH: 0

## 2018-08-03 ENCOUNTER — OFFICE VISIT (OUTPATIENT)
Dept: FAMILY MEDICINE CLINIC | Age: 83
End: 2018-08-03
Payer: MEDICARE

## 2018-08-03 VITALS
SYSTOLIC BLOOD PRESSURE: 160 MMHG | BODY MASS INDEX: 17.67 KG/M2 | OXYGEN SATURATION: 99 % | DIASTOLIC BLOOD PRESSURE: 96 MMHG | HEIGHT: 58 IN | TEMPERATURE: 97.9 F | RESPIRATION RATE: 14 BRPM | HEART RATE: 62 BPM | WEIGHT: 84.2 LBS

## 2018-08-03 DIAGNOSIS — M79.631 RIGHT FOREARM PAIN: ICD-10-CM
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
## 2018-08-03 DIAGNOSIS — S52.501A CLOSED FRACTURE OF DISTAL END OF RIGHT RADIUS, UNSPECIFIED FRACTURE MORPHOLOGY, INITIAL ENCOUNTER: ICD-10-CM

## 2018-08-03 DIAGNOSIS — M25.531 WRIST PAIN, ACUTE, RIGHT: ICD-10-CM

## 2018-08-03 DIAGNOSIS — S51.011A SKIN TEAR OF RIGHT ELBOW WITHOUT COMPLICATION, INITIAL ENCOUNTER: ICD-10-CM

## 2018-08-03 DIAGNOSIS — M79.641 RIGHT HAND PAIN: ICD-10-CM

## 2018-08-03 PROCEDURE — 17250 CHEM CAUT OF GRANLTJ TISSUE: CPT | Performed by: FAMILY MEDICINE

## 2018-08-03 PROCEDURE — 99214 OFFICE O/P EST MOD 30 MIN: CPT | Performed by: FAMILY MEDICINE

## 2018-08-03 PROCEDURE — 90471 IMMUNIZATION ADMIN: CPT | Performed by: FAMILY MEDICINE

## 2018-08-03 PROCEDURE — 90714 TD VACC NO PRESV 7 YRS+ IM: CPT | Performed by: FAMILY MEDICINE

## 2018-08-03 ASSESSMENT — ENCOUNTER SYMPTOMS: SHORTNESS OF BREATH: 0

## 2018-08-03 NOTE — PROGRESS NOTES
Subjective:      Patient ID: Luis Felipe Baird is a 80 y.o. female    Fall   The accident occurred 3 to 6 hours ago. The fall occurred from a ladder. She fell from a height of 1 to 2 ft. She landed on hard floor. The volume of blood lost was minimal. The point of impact was the right wrist. The pain is moderate. Associated symptoms include tingling. She has tried nothing for the symptoms. The treatment provided mild relief. Wrist Pain    Associated symptoms include tingling. Here with fall that happened about 7;15 this am.  Was stepping off step stool from second step when she lost balance and fell to kitchen floor. No head injury of loc. Can't move right wrist very well. Does have small skin tear to arm. Review of Systems   Constitutional: Positive for activity change. Negative for unexpected weight change. Respiratory: Negative for shortness of breath. Musculoskeletal: Positive for arthralgias and joint swelling. Skin: Positive for wound. Neurological: Positive for tingling and weakness. Negative for dizziness. Reviewed allergy, medical, social, surgical, family and med list changes and updated   Files  Social History     Social History    Marital status:       Spouse name: N/A    Number of children: N/A    Years of education: N/A     Social History Main Topics    Smoking status: Never Smoker    Smokeless tobacco: Never Used    Alcohol use Yes      Comment: SOCIALLY    Drug use: No    Sexual activity: No     Other Topics Concern    None     Social History Narrative    None     Current Outpatient Prescriptions   Medication Sig Dispense Refill    metoprolol succinate (TOPROL XL) 50 MG extended release tablet Take 1 tablet by mouth daily 90 tablet 0    Multiple Vitamins-Minerals (MULTIVITAMIN WOMEN PO) Take by mouth daily       acetaminophen (TYLENOL) 500 MG tablet Take 500 mg by mouth every 6 hours as needed for Pain      ketoconazole (NIZORAL) 2 % cream APPLY TO AFFECTED AREA AS DIRECTED  1    diclofenac sodium 1 % GEL Apply 2 g topically 4 times daily To affected knee      triamcinolone (KENALOG) 0.1 % cream APPLY DAILY FRIDAY TO SUNDAY TO LEG DERMATITIS  2    Emollient (CERAVE) CREA Apply topically 2 times daily      hydroxychloroquine (PLAQUENIL) 200 MG tablet Take 2 tabs QD  5    aspirin 81 MG EC tablet Take 81 mg by mouth daily.  Calcium Carbonate-Vitamin D (CALCIUM + D PO) Take by mouth daily       Wheat Dextrin (BENEFIBER PO) Take  by mouth daily. 1 tsp        No current facility-administered medications for this visit. History reviewed. No pertinent family history. Past Medical History:   Diagnosis Date    Cancer ([facility] [address]     H/O SKIN,TWO REMOVED FROM NOSE,MOLES ALL OVER BODY    Elevated liver enzymes     History of    Hypertension     Osteoarthritis    xray osteoporotic with angulated fracture of distal radius   Objective:   BP (!) 160/96   Pulse 62   Temp 97.9 °F (36.6 °C)   Resp 14   Ht 4' 10\" (1.473 m)   Wt 84 lb 3.2 oz (38.2 kg)   SpO2 99%   BMI 17.60 kg/m²     Physical Exam   Musculoskeletal:        Arms:  Black equals area of swelling and bruising and tenderness with bony deformity along radial aspect of wrist.  Not able to flex and extend wrist secondary to pain. No finger or hand tenderness. No elbow tenderness with minimal tenderness over the mid ulnar aspect of right forearm with 2 cm superificial skin tear present    Lungs;  Clear and equal breath sounds   Heart:  Rate reg. 1/6 syst murmur along llsb  Pulses; Radial 2+ and equal     Assessment:       Diagnosis Orders   1. Fall, initial encounter  XR HAND RIGHT (MIN 3 VIEWS)    XR WRIST RIGHT (MIN 3 VIEWS)    XR RADIUS ULNA RIGHT (2 VIEWS)    External Referral - Nilam Pelaez MD - Arthroscopic Shoulder Surgery, Sports Med   2.  Closed fracture of distal end of right radius, unspecified fracture morphology, initial encounter  External Referral - Nilam Pelaez MD - Arthroscopic

## 2018-08-13 RX ORDER — METOPROLOL SUCCINATE 50 MG/1
TABLET, EXTENDED RELEASE ORAL
Qty: 90 TABLET | Refills: 0 | Status: SHIPPED | OUTPATIENT
Start: 2018-08-13 | End: 2018-11-11 | Stop reason: SDUPTHER

## 2018-08-21 ENCOUNTER — HOSPITAL ENCOUNTER (OUTPATIENT)
Dept: ORTHOPEDIC SURGERY | Age: 83
Discharge: HOME | End: 2018-08-23
Payer: MEDICARE

## 2018-08-21 DIAGNOSIS — S52.571A OTHER INTRAARTICULAR FRACTURE OF LOWER END OF RIGHT RADIUS, INITIAL ENCOUNTER FOR CLOSED FRACTURE: ICD-10-CM

## 2018-08-21 PROCEDURE — 73110 X-RAY EXAM OF WRIST: CPT

## 2018-08-28 DIAGNOSIS — I10 ESSENTIAL HYPERTENSION: ICD-10-CM

## 2018-08-28 LAB
ALBUMIN SERPL-MCNC: 3.7 G/DL (ref 3.9–4.9)
ALP BLD-CCNC: 99 U/L (ref 40–130)
ALT SERPL-CCNC: 46 U/L (ref 0–33)
ANION GAP SERPL CALCULATED.3IONS-SCNC: 16 MEQ/L (ref 7–13)
AST SERPL-CCNC: 57 U/L (ref 0–35)
BASOPHILS ABSOLUTE: 0.1 K/UL (ref 0–0.2)
BASOPHILS RELATIVE PERCENT: 0.7 %
BILIRUB SERPL-MCNC: 0.5 MG/DL (ref 0–1.2)
BUN BLDV-MCNC: 26 MG/DL (ref 8–23)
CALCIUM SERPL-MCNC: 9.4 MG/DL (ref 8.6–10.2)
CHLORIDE BLD-SCNC: 102 MEQ/L (ref 98–107)
CHOLESTEROL, TOTAL: 163 MG/DL (ref 0–199)
CO2: 26 MEQ/L (ref 22–29)
CREAT SERPL-MCNC: 1.06 MG/DL (ref 0.5–0.9)
EOSINOPHILS ABSOLUTE: 0 K/UL (ref 0–0.7)
EOSINOPHILS RELATIVE PERCENT: 0 %
GFR AFRICAN AMERICAN: 58
GFR NON-AFRICAN AMERICAN: 47.9
GLOBULIN: 2.7 G/DL (ref 2.3–3.5)
GLUCOSE BLD-MCNC: 83 MG/DL (ref 74–109)
HCT VFR BLD CALC: 38 % (ref 37–47)
HDLC SERPL-MCNC: 62 MG/DL (ref 40–59)
HEMOGLOBIN: 12.9 G/DL (ref 12–16)
LDL CHOLESTEROL CALCULATED: 89 MG/DL (ref 0–129)
LYMPHOCYTES ABSOLUTE: 1.4 K/UL (ref 1–4.8)
LYMPHOCYTES RELATIVE PERCENT: 19.2 %
MCH RBC QN AUTO: 34.2 PG (ref 27–31.3)
MCHC RBC AUTO-ENTMCNC: 33.9 % (ref 33–37)
MCV RBC AUTO: 101.1 FL (ref 82–100)
MONOCYTES ABSOLUTE: 0.7 K/UL (ref 0.2–0.8)
MONOCYTES RELATIVE PERCENT: 10.1 %
NEUTROPHILS ABSOLUTE: 4.9 K/UL (ref 1.4–6.5)
NEUTROPHILS RELATIVE PERCENT: 70 %
PDW BLD-RTO: 14.4 % (ref 11.5–14.5)
PLATELET # BLD: 227 K/UL (ref 130–400)
POTASSIUM SERPL-SCNC: 4.2 MEQ/L (ref 3.5–5.1)
RBC # BLD: 3.76 M/UL (ref 4.2–5.4)
SODIUM BLD-SCNC: 144 MEQ/L (ref 132–144)
TOTAL PROTEIN: 6.4 G/DL (ref 6.4–8.1)
TRIGL SERPL-MCNC: 61 MG/DL (ref 0–200)
WBC # BLD: 7.1 K/UL (ref 4.8–10.8)

## 2018-09-04 ENCOUNTER — OFFICE VISIT (OUTPATIENT)
Dept: FAMILY MEDICINE CLINIC | Age: 83
End: 2018-09-04
Payer: MEDICARE

## 2018-09-04 VITALS
HEART RATE: 62 BPM | HEIGHT: 58 IN | WEIGHT: 83.3 LBS | TEMPERATURE: 97.9 F | BODY MASS INDEX: 17.48 KG/M2 | RESPIRATION RATE: 12 BRPM | SYSTOLIC BLOOD PRESSURE: 128 MMHG | OXYGEN SATURATION: 97 % | DIASTOLIC BLOOD PRESSURE: 70 MMHG

## 2018-09-04 DIAGNOSIS — I10 ESSENTIAL HYPERTENSION: Primary | ICD-10-CM

## 2018-09-04 DIAGNOSIS — R74.8 ELEVATED LIVER ENZYMES: ICD-10-CM

## 2018-09-04 PROCEDURE — 99213 OFFICE O/P EST LOW 20 MIN: CPT | Performed by: FAMILY MEDICINE

## 2018-09-04 ASSESSMENT — ENCOUNTER SYMPTOMS
ABDOMINAL PAIN: 0
SHORTNESS OF BREATH: 0

## 2018-09-04 NOTE — PROGRESS NOTES
Subjective:      Patient ID: Aggie Amezquita is a 80 y.o. female. HPIhere in follow up for htn and elevated liver enzymes. Alcohol intake about the same as last time  Treatment Adherence:   Medication compliance:  compliant most of the time  Diet compliance:  compliant most of the time  Weight trend: stable  Current exercise: no regular exercise  Barriers: none    Hypertension:  Home blood pressure monitoring: Yes - . She is adherent to a low sodium diet. Patient denies chest pain. Antihypertensive medication side effects: no medication side effects noted. Use of agents associated with hypertension: none. Sodium (mEq/L)   Date Value   08/28/2018 144    BUN (mg/dL)   Date Value   08/28/2018 26 (H)    Glucose (mg/dL)   Date Value   08/28/2018 83      Potassium (mEq/L)   Date Value   08/28/2018 4.2    CREATININE (mg/dL)   Date Value   08/28/2018 1.06 (H)         Hyperlipidemia:  No new myalgias or GI upset on none. Lab Results   Component Value Date    CHOL 163 08/28/2018    TRIG 61 08/28/2018    HDL 62 (H) 08/28/2018    LDLCALC 89 08/28/2018     Lab Results   Component Value Date    ALT 46 (H) 08/28/2018    AST 57 (H) 08/28/2018          Review of Systems   Constitutional: Negative for activity change and appetite change. Respiratory: Negative for shortness of breath. Cardiovascular: Negative for chest pain and leg swelling. Gastrointestinal: Negative for abdominal pain. Skin: Negative for rash. Neurological: Negative for dizziness. Reviewed allergy, medical, social, surgical, family and med list changes and updated   Files--reviewed blood work with mildly elevated liver enzymes   Social History     Social History    Marital status:       Spouse name: N/A    Number of children: N/A    Years of education: N/A     Social History Main Topics    Smoking status: Never Smoker    Smokeless tobacco: Never Used    Alcohol use Yes      Comment: SOCIALLY work in 3 months and if stable f/u in march

## 2018-09-06 ENCOUNTER — TELEPHONE (OUTPATIENT)
Dept: FAMILY MEDICINE CLINIC | Age: 83
End: 2018-09-06

## 2018-10-20 ENCOUNTER — TELEPHONE (OUTPATIENT)
Dept: FAMILY MEDICINE CLINIC | Age: 83
End: 2018-10-20

## 2018-10-23 ENCOUNTER — NURSE ONLY (OUTPATIENT)
Dept: FAMILY MEDICINE CLINIC | Age: 83
End: 2018-10-23
Payer: MEDICARE

## 2018-10-23 DIAGNOSIS — Z23 NEED FOR INFLUENZA VACCINATION: Primary | ICD-10-CM

## 2018-10-23 PROCEDURE — 90662 IIV NO PRSV INCREASED AG IM: CPT | Performed by: FAMILY MEDICINE

## 2018-10-23 PROCEDURE — G0008 ADMIN INFLUENZA VIRUS VAC: HCPCS | Performed by: FAMILY MEDICINE

## 2018-11-12 RX ORDER — METOPROLOL SUCCINATE 50 MG/1
TABLET, EXTENDED RELEASE ORAL
Qty: 90 TABLET | Refills: 0 | Status: SHIPPED | OUTPATIENT
Start: 2018-11-12 | End: 2019-01-01 | Stop reason: SDUPTHER

## 2018-11-26 RX ORDER — METOPROLOL SUCCINATE 25 MG/1
TABLET, EXTENDED RELEASE ORAL
Qty: 90 TABLET | Refills: 0 | Status: SHIPPED | OUTPATIENT
Start: 2018-11-26 | End: 2018-12-06 | Stop reason: DRUGHIGH

## 2018-12-04 ENCOUNTER — NURSE ONLY (OUTPATIENT)
Dept: INTERNAL MEDICINE CLINIC | Age: 83
End: 2018-12-04
Payer: MEDICARE

## 2018-12-04 DIAGNOSIS — Z23 NEED FOR PNEUMOCOCCAL VACCINATION: Primary | ICD-10-CM

## 2018-12-04 DIAGNOSIS — R74.8 ELEVATED LIVER ENZYMES: ICD-10-CM

## 2018-12-04 LAB
ALBUMIN SERPL-MCNC: 4 G/DL (ref 3.9–4.9)
ALP BLD-CCNC: 93 U/L (ref 40–130)
ALT SERPL-CCNC: 48 U/L (ref 0–33)
AST SERPL-CCNC: 63 U/L (ref 0–35)
BILIRUB SERPL-MCNC: 0.5 MG/DL (ref 0–1.2)
BILIRUBIN DIRECT: <0.2 MG/DL (ref 0–0.3)
BILIRUBIN, INDIRECT: ABNORMAL MG/DL (ref 0–0.6)
TOTAL PROTEIN: 6.8 G/DL (ref 6.4–8.1)

## 2018-12-04 PROCEDURE — G0009 ADMIN PNEUMOCOCCAL VACCINE: HCPCS | Performed by: FAMILY MEDICINE

## 2018-12-04 PROCEDURE — 90732 PPSV23 VACC 2 YRS+ SUBQ/IM: CPT | Performed by: FAMILY MEDICINE

## 2019-01-01 ENCOUNTER — OFFICE VISIT (OUTPATIENT)
Dept: FAMILY MEDICINE CLINIC | Age: 84
End: 2019-01-01
Payer: MEDICARE

## 2019-01-01 ENCOUNTER — TELEPHONE (OUTPATIENT)
Dept: FAMILY MEDICINE CLINIC | Age: 84
End: 2019-01-01

## 2019-01-01 VITALS
RESPIRATION RATE: 16 BRPM | DIASTOLIC BLOOD PRESSURE: 58 MMHG | HEIGHT: 58 IN | OXYGEN SATURATION: 97 % | BODY MASS INDEX: 17.17 KG/M2 | TEMPERATURE: 98.3 F | HEART RATE: 61 BPM | WEIGHT: 81.8 LBS | SYSTOLIC BLOOD PRESSURE: 136 MMHG

## 2019-01-01 DIAGNOSIS — I10 ESSENTIAL HYPERTENSION: Primary | ICD-10-CM

## 2019-01-01 DIAGNOSIS — R74.8 ELEVATED LIVER ENZYMES: ICD-10-CM

## 2019-01-01 DIAGNOSIS — M06.9 RHEUMATOID ARTHRITIS, INVOLVING UNSPECIFIED SITE, UNSPECIFIED RHEUMATOID FACTOR PRESENCE: ICD-10-CM

## 2019-01-01 DIAGNOSIS — I10 ESSENTIAL HYPERTENSION: ICD-10-CM

## 2019-01-01 DIAGNOSIS — E78.00 PURE HYPERCHOLESTEROLEMIA: ICD-10-CM

## 2019-01-01 LAB
ALBUMIN SERPL-MCNC: 4.2 G/DL (ref 3.5–4.6)
ALP BLD-CCNC: 88 U/L (ref 40–130)
ALT SERPL-CCNC: 23 U/L (ref 0–33)
ANION GAP SERPL CALCULATED.3IONS-SCNC: 12 MEQ/L (ref 9–15)
AST SERPL-CCNC: 44 U/L (ref 0–35)
BASOPHILS ABSOLUTE: 0.1 K/UL (ref 0–0.2)
BASOPHILS RELATIVE PERCENT: 1.1 %
BILIRUB SERPL-MCNC: 0.5 MG/DL (ref 0.2–0.7)
BUN BLDV-MCNC: 31 MG/DL (ref 8–23)
CALCIUM SERPL-MCNC: 10.1 MG/DL (ref 8.5–9.9)
CHLORIDE BLD-SCNC: 97 MEQ/L (ref 95–107)
CHOLESTEROL, TOTAL: 184 MG/DL (ref 0–199)
CO2: 28 MEQ/L (ref 20–31)
CREAT SERPL-MCNC: 0.97 MG/DL (ref 0.5–0.9)
EOSINOPHILS ABSOLUTE: 0.3 K/UL (ref 0–0.7)
EOSINOPHILS RELATIVE PERCENT: 4.6 %
GFR AFRICAN AMERICAN: >60
GFR NON-AFRICAN AMERICAN: 53
GLOBULIN: 2.9 G/DL (ref 2.3–3.5)
GLUCOSE BLD-MCNC: 85 MG/DL (ref 70–99)
HCT VFR BLD CALC: 39.7 % (ref 37–47)
HDLC SERPL-MCNC: 63 MG/DL (ref 40–59)
HEMOGLOBIN: 13.5 G/DL (ref 12–16)
LDL CHOLESTEROL CALCULATED: 104 MG/DL (ref 0–129)
LYMPHOCYTES ABSOLUTE: 1.6 K/UL (ref 1–4.8)
LYMPHOCYTES RELATIVE PERCENT: 26.2 %
MCH RBC QN AUTO: 33.8 PG (ref 27–31.3)
MCHC RBC AUTO-ENTMCNC: 34 % (ref 33–37)
MCV RBC AUTO: 99.4 FL (ref 82–100)
MONOCYTES ABSOLUTE: 0.7 K/UL (ref 0.2–0.8)
MONOCYTES RELATIVE PERCENT: 11.2 %
NEUTROPHILS ABSOLUTE: 3.5 K/UL (ref 1.4–6.5)
NEUTROPHILS RELATIVE PERCENT: 56.9 %
PDW BLD-RTO: 13.6 % (ref 11.5–14.5)
PLATELET # BLD: 208 K/UL (ref 130–400)
POTASSIUM SERPL-SCNC: 4.4 MEQ/L (ref 3.4–4.9)
RBC # BLD: 3.99 M/UL (ref 4.2–5.4)
SODIUM BLD-SCNC: 137 MEQ/L (ref 135–144)
TOTAL PROTEIN: 7.1 G/DL (ref 6.3–8)
TRIGL SERPL-MCNC: 86 MG/DL (ref 0–150)
WBC # BLD: 6.1 K/UL (ref 4.8–10.8)

## 2019-01-01 PROCEDURE — 99214 OFFICE O/P EST MOD 30 MIN: CPT | Performed by: FAMILY MEDICINE

## 2019-01-01 PROCEDURE — G8510 SCR DEP NEG, NO PLAN REQD: HCPCS | Performed by: FAMILY MEDICINE

## 2019-01-01 PROCEDURE — 3288F FALL RISK ASSESSMENT DOCD: CPT | Performed by: FAMILY MEDICINE

## 2019-01-01 RX ORDER — METOPROLOL SUCCINATE 50 MG/1
TABLET, EXTENDED RELEASE ORAL
Qty: 30 TABLET | Refills: 0 | Status: SHIPPED | OUTPATIENT
Start: 2019-01-01 | End: 2019-01-01 | Stop reason: SDUPTHER

## 2019-01-01 RX ORDER — METOPROLOL SUCCINATE 50 MG/1
TABLET, EXTENDED RELEASE ORAL
Qty: 90 TABLET | Refills: 1 | Status: SHIPPED | OUTPATIENT
Start: 2019-01-01 | End: 2020-01-01 | Stop reason: SDUPTHER

## 2019-01-01 ASSESSMENT — PATIENT HEALTH QUESTIONNAIRE - PHQ9
1. LITTLE INTEREST OR PLEASURE IN DOING THINGS: 0
2. FEELING DOWN, DEPRESSED OR HOPELESS: 0
SUM OF ALL RESPONSES TO PHQ9 QUESTIONS 1 & 2: 0
SUM OF ALL RESPONSES TO PHQ QUESTIONS 1-9: 0
SUM OF ALL RESPONSES TO PHQ QUESTIONS 1-9: 0

## 2019-01-01 ASSESSMENT — ENCOUNTER SYMPTOMS: SHORTNESS OF BREATH: 0

## 2019-09-03 NOTE — PROGRESS NOTES
Subjective:      Patient ID: Daila Mitchell is a 80 y.o. female    Hypertension   This is a chronic problem. The current episode started more than 1 year ago. The problem is resistant. Pertinent negatives include no shortness of breath. Risk factors for coronary artery disease include sedentary lifestyle, dyslipidemia and stress. Past treatments include beta blockers. The current treatment provides mild improvement. Hyperlipidemia   Pertinent negatives include no shortness of breath. Other   Associated symptoms include arthralgias. Pertinent negatives include no rash or weakness. Here with family. Here in follow up for htn and lipids and elevated liver enzymes. No missed doses of toprol. Drinking about 6 oz of wine per day. Recently taken off plaquenil secondary to affects on eye thru eye doctor. Family would like different rheumatologist.        Review of Systems   Constitutional: Negative for activity change and unexpected weight change. Respiratory: Negative for shortness of breath. Musculoskeletal: Positive for arthralgias. Skin: Negative for rash. Neurological: Negative for dizziness and weakness. Reviewed allergy, medical, social, surgical, family and med list changes and updated   Files--reviewed blood work with minimally elevated single liver enzyme      Social History     Socioeconomic History    Marital status:       Spouse name: None    Number of children: None    Years of education: None    Highest education level: None   Occupational History    None   Social Needs    Financial resource strain: None    Food insecurity:     Worry: None     Inability: None    Transportation needs:     Medical: None     Non-medical: None   Tobacco Use    Smoking status: Never Smoker    Smokeless tobacco: Never Used   Substance and Sexual Activity    Alcohol use: Yes     Comment: SOCIALLY    Drug use: No    Sexual activity: Never   Lifestyle    Physical activity:     Days per week:

## 2019-09-03 NOTE — TELEPHONE ENCOUNTER
Pt daughter requesting medication refill.      Rx requested:  Requested Prescriptions     Pending Prescriptions Disp Refills    metoprolol succinate (TOPROL XL) 50 MG extended release tablet 90 tablet 1     Sig: TAKE 1 TABLET DAILY       Last Office Visit:   9/3/2019    Last Tox screen:    None    Last Medication contract:    None     Next Visit Date:  Future Appointments   Date Time Provider Saw Schmidt   3/5/2020 11:30 AM Lainey Mendieta MD [address] [place] [address] [id number]

## 2020-01-01 ENCOUNTER — APPOINTMENT (OUTPATIENT)
Dept: ULTRASOUND IMAGING | Age: 85
DRG: 682 | End: 2020-01-01
Payer: MEDICARE

## 2020-01-01 ENCOUNTER — HOSPITAL ENCOUNTER (INPATIENT)
Age: 85
LOS: 2 days | Discharge: HOSPICE | DRG: 682 | End: 2020-04-17
Attending: EMERGENCY MEDICINE | Admitting: INTERNAL MEDICINE
Payer: MEDICARE

## 2020-01-01 ENCOUNTER — OFFICE VISIT (OUTPATIENT)
Dept: FAMILY MEDICINE CLINIC | Age: 85
End: 2020-01-01
Payer: MEDICARE

## 2020-01-01 ENCOUNTER — VIRTUAL VISIT (OUTPATIENT)
Dept: FAMILY MEDICINE CLINIC | Age: 85
End: 2020-01-01
Payer: MEDICARE

## 2020-01-01 ENCOUNTER — APPOINTMENT (OUTPATIENT)
Dept: GENERAL RADIOLOGY | Age: 85
DRG: 682 | End: 2020-01-01
Payer: MEDICARE

## 2020-01-01 ENCOUNTER — TELEPHONE (OUTPATIENT)
Dept: FAMILY MEDICINE CLINIC | Age: 85
End: 2020-01-01

## 2020-01-01 ENCOUNTER — HOSPITAL ENCOUNTER (OUTPATIENT)
Dept: MRI IMAGING | Age: 85
Discharge: HOME | End: 2020-02-13
Payer: MEDICARE

## 2020-01-01 ENCOUNTER — APPOINTMENT (OUTPATIENT)
Dept: CT IMAGING | Age: 85
DRG: 682 | End: 2020-01-01
Payer: MEDICARE

## 2020-01-01 ENCOUNTER — HOSPITAL ENCOUNTER (OUTPATIENT)
Dept: ULTRASOUND IMAGING | Age: 85
Discharge: HOME | End: 2020-02-13
Payer: MEDICARE

## 2020-01-01 VITALS
SYSTOLIC BLOOD PRESSURE: 120 MMHG | RESPIRATION RATE: 14 BRPM | OXYGEN SATURATION: 95 % | HEIGHT: 58 IN | HEART RATE: 50 BPM | BODY MASS INDEX: 17.21 KG/M2 | WEIGHT: 82 LBS | DIASTOLIC BLOOD PRESSURE: 70 MMHG | TEMPERATURE: 98.4 F

## 2020-01-01 VITALS
HEART RATE: 97 BPM | RESPIRATION RATE: 30 BRPM | OXYGEN SATURATION: 96 % | HEIGHT: 58 IN | DIASTOLIC BLOOD PRESSURE: 68 MMHG | BODY MASS INDEX: 16.79 KG/M2 | SYSTOLIC BLOOD PRESSURE: 184 MMHG | WEIGHT: 80 LBS | TEMPERATURE: 98.1 F

## 2020-01-01 VITALS
TEMPERATURE: 98.4 F | WEIGHT: 82 LBS | DIASTOLIC BLOOD PRESSURE: 80 MMHG | RESPIRATION RATE: 14 BRPM | OXYGEN SATURATION: 96 % | HEART RATE: 78 BPM | HEIGHT: 58 IN | SYSTOLIC BLOOD PRESSURE: 120 MMHG | BODY MASS INDEX: 17.21 KG/M2

## 2020-01-01 VITALS
SYSTOLIC BLOOD PRESSURE: 130 MMHG | OXYGEN SATURATION: 95 % | WEIGHT: 84.8 LBS | BODY MASS INDEX: 16.65 KG/M2 | HEIGHT: 60 IN | HEART RATE: 56 BPM | TEMPERATURE: 97.6 F | RESPIRATION RATE: 14 BRPM | DIASTOLIC BLOOD PRESSURE: 60 MMHG

## 2020-01-01 DIAGNOSIS — I10 ESSENTIAL HYPERTENSION: ICD-10-CM

## 2020-01-01 DIAGNOSIS — R74.8 ELEVATED LIVER ENZYMES: ICD-10-CM

## 2020-01-01 DIAGNOSIS — E78.00 PURE HYPERCHOLESTEROLEMIA: ICD-10-CM

## 2020-01-01 DIAGNOSIS — R41.3 MEMORY CHANGES: ICD-10-CM

## 2020-01-01 LAB
ALBUMIN SERPL-MCNC: 3.4 G/DL (ref 3.5–4.6)
ALBUMIN SERPL-MCNC: 3.9 G/DL (ref 3.5–4.6)
ALBUMIN SERPL-MCNC: 4 G/DL (ref 3.5–4.6)
ALP BLD-CCNC: 123 U/L (ref 40–130)
ALP BLD-CCNC: 133 U/L (ref 40–130)
ALP BLD-CCNC: 190 U/L (ref 40–130)
ALT SERPL-CCNC: 11 U/L (ref 0–33)
ALT SERPL-CCNC: 17 U/L (ref 0–33)
ALT SERPL-CCNC: 44 U/L (ref 0–33)
ANION GAP SERPL CALCULATED.3IONS-SCNC: 11 MEQ/L (ref 9–15)
ANION GAP SERPL CALCULATED.3IONS-SCNC: 14 MEQ/L (ref 9–15)
ANION GAP SERPL CALCULATED.3IONS-SCNC: 21 MEQ/L (ref 9–15)
ANION GAP SERPL CALCULATED.3IONS-SCNC: 22 MEQ/L (ref 9–15)
ANION GAP SERPL CALCULATED.3IONS-SCNC: 23 MEQ/L (ref 9–15)
ANION GAP SERPL CALCULATED.3IONS-SCNC: 28 MEQ/L (ref 9–15)
AST SERPL-CCNC: 20 U/L (ref 0–35)
AST SERPL-CCNC: 30 U/L (ref 0–35)
AST SERPL-CCNC: 54 U/L (ref 0–35)
BASOPHILS ABSOLUTE: 0.1 K/UL (ref 0–0.2)
BASOPHILS ABSOLUTE: 0.1 K/UL (ref 0–0.2)
BASOPHILS RELATIVE PERCENT: 0.7 %
BASOPHILS RELATIVE PERCENT: 0.9 %
BILIRUB SERPL-MCNC: 0.3 MG/DL (ref 0.2–0.7)
BILIRUB SERPL-MCNC: 0.5 MG/DL (ref 0.2–0.7)
BILIRUB SERPL-MCNC: 0.5 MG/DL (ref 0.2–0.7)
BILIRUBIN URINE: ABNORMAL
BLOOD, URINE: ABNORMAL
BUN BLDV-MCNC: 102 MG/DL (ref 8–23)
BUN BLDV-MCNC: 104 MG/DL (ref 8–23)
BUN BLDV-MCNC: 105 MG/DL (ref 8–23)
BUN BLDV-MCNC: 116 MG/DL (ref 8–23)
BUN BLDV-MCNC: 32 MG/DL (ref 8–23)
BUN BLDV-MCNC: 39 MG/DL (ref 8–23)
CALCIUM SERPL-MCNC: 9.2 MG/DL (ref 8.5–9.9)
CALCIUM SERPL-MCNC: 9.4 MG/DL (ref 8.5–9.9)
CALCIUM SERPL-MCNC: 9.6 MG/DL (ref 8.5–9.9)
CALCIUM SERPL-MCNC: 9.6 MG/DL (ref 8.5–9.9)
CALCIUM SERPL-MCNC: 9.7 MG/DL (ref 8.5–9.9)
CALCIUM SERPL-MCNC: 9.9 MG/DL (ref 8.5–9.9)
CHLORIDE BLD-SCNC: 102 MEQ/L (ref 95–107)
CHLORIDE BLD-SCNC: 88 MEQ/L (ref 95–107)
CHLORIDE BLD-SCNC: 92 MEQ/L (ref 95–107)
CHLORIDE BLD-SCNC: 93 MEQ/L (ref 95–107)
CHLORIDE BLD-SCNC: 96 MEQ/L (ref 95–107)
CHLORIDE BLD-SCNC: 98 MEQ/L (ref 95–107)
CHOLESTEROL, TOTAL: 196 MG/DL (ref 0–199)
CLARITY: ABNORMAL
CO2: 17 MEQ/L (ref 20–31)
CO2: 18 MEQ/L (ref 20–31)
CO2: 18 MEQ/L (ref 20–31)
CO2: 22 MEQ/L (ref 20–31)
CO2: 27 MEQ/L (ref 20–31)
CO2: 29 MEQ/L (ref 20–31)
COLOR: ABNORMAL
CREAT SERPL-MCNC: 0.91 MG/DL (ref 0.5–0.9)
CREAT SERPL-MCNC: 1.01 MG/DL (ref 0.5–0.9)
CREAT SERPL-MCNC: 10.31 MG/DL (ref 0.5–0.9)
CREAT SERPL-MCNC: 8.2 MG/DL (ref 0.5–0.9)
CREAT SERPL-MCNC: 8.6 MG/DL (ref 0.5–0.9)
CREAT SERPL-MCNC: 8.66 MG/DL (ref 0.5–0.9)
EKG ATRIAL RATE: 67 BPM
EKG P AXIS: 54 DEGREES
EKG P-R INTERVAL: 180 MS
EKG Q-T INTERVAL: 412 MS
EKG QRS DURATION: 124 MS
EKG QTC CALCULATION (BAZETT): 435 MS
EKG R AXIS: -50 DEGREES
EKG T AXIS: 107 DEGREES
EKG VENTRICULAR RATE: 67 BPM
EOSINOPHILS ABSOLUTE: 0.2 K/UL (ref 0–0.7)
EOSINOPHILS ABSOLUTE: 0.3 K/UL (ref 0–0.7)
EOSINOPHILS RELATIVE PERCENT: 1.3 %
EOSINOPHILS RELATIVE PERCENT: 4.9 %
EPITHELIAL CELLS, UA: >100 /HPF (ref 0–5)
FOLATE: 17.6 NG/ML (ref 7.3–26.1)
GFR AFRICAN AMERICAN: 4.2
GFR AFRICAN AMERICAN: 5.1
GFR AFRICAN AMERICAN: 5.2
GFR AFRICAN AMERICAN: 5.5
GFR AFRICAN AMERICAN: >60
GFR AFRICAN AMERICAN: >60
GFR NON-AFRICAN AMERICAN: 3.5
GFR NON-AFRICAN AMERICAN: 4.2
GFR NON-AFRICAN AMERICAN: 4.3
GFR NON-AFRICAN AMERICAN: 4.5
GFR NON-AFRICAN AMERICAN: 50.5
GFR NON-AFRICAN AMERICAN: 57
GLOBULIN: 2.6 G/DL (ref 2.3–3.5)
GLOBULIN: 3 G/DL (ref 2.3–3.5)
GLOBULIN: 3.3 G/DL (ref 2.3–3.5)
GLUCOSE BLD-MCNC: 107 MG/DL (ref 70–99)
GLUCOSE BLD-MCNC: 137 MG/DL (ref 70–99)
GLUCOSE BLD-MCNC: 72 MG/DL (ref 70–99)
GLUCOSE BLD-MCNC: 74 MG/DL (ref 70–99)
GLUCOSE BLD-MCNC: 81 MG/DL (ref 70–99)
GLUCOSE BLD-MCNC: 83 MG/DL (ref 70–99)
GLUCOSE URINE: NEGATIVE MG/DL
HCT VFR BLD CALC: 37.2 % (ref 37–47)
HCT VFR BLD CALC: 40.7 % (ref 37–47)
HDLC SERPL-MCNC: 63 MG/DL (ref 40–59)
HEMOGLOBIN: 12.4 G/DL (ref 12–16)
HEMOGLOBIN: 13.4 G/DL (ref 12–16)
INR BLD: 1.1
KETONES, URINE: NEGATIVE MG/DL
LACTIC ACID: 1.1 MMOL/L (ref 0.5–2.2)
LDL CHOLESTEROL CALCULATED: 108 MG/DL (ref 0–129)
LEUKOCYTE ESTERASE, URINE: ABNORMAL
LYMPHOCYTES ABSOLUTE: 1.5 K/UL (ref 1–4.8)
LYMPHOCYTES ABSOLUTE: 1.8 K/UL (ref 1–4.8)
LYMPHOCYTES RELATIVE PERCENT: 13.6 %
LYMPHOCYTES RELATIVE PERCENT: 22.7 %
MAGNESIUM: 2.9 MG/DL (ref 1.7–2.4)
MCH RBC QN AUTO: 31.7 PG (ref 27–31.3)
MCH RBC QN AUTO: 32.9 PG (ref 27–31.3)
MCHC RBC AUTO-ENTMCNC: 32.9 % (ref 33–37)
MCHC RBC AUTO-ENTMCNC: 33.4 % (ref 33–37)
MCV RBC AUTO: 100 FL (ref 82–100)
MCV RBC AUTO: 94.7 FL (ref 82–100)
MONOCYTES ABSOLUTE: 0.8 K/UL (ref 0.2–0.8)
MONOCYTES ABSOLUTE: 1 K/UL (ref 0.2–0.8)
MONOCYTES RELATIVE PERCENT: 11.8 %
MONOCYTES RELATIVE PERCENT: 7.9 %
NEUTROPHILS ABSOLUTE: 10 K/UL (ref 1.4–6.5)
NEUTROPHILS ABSOLUTE: 4 K/UL (ref 1.4–6.5)
NEUTROPHILS RELATIVE PERCENT: 59.7 %
NEUTROPHILS RELATIVE PERCENT: 76.5 %
NITRITE, URINE: POSITIVE
PDW BLD-RTO: 13.4 % (ref 11.5–14.5)
PDW BLD-RTO: 13.9 % (ref 11.5–14.5)
PH UA: 7 (ref 5–9)
PLATELET # BLD: 215 K/UL (ref 130–400)
PLATELET # BLD: 244 K/UL (ref 130–400)
POTASSIUM REFLEX MAGNESIUM: 5.2 MEQ/L (ref 3.4–4.9)
POTASSIUM SERPL-SCNC: 3.8 MEQ/L (ref 3.4–4.9)
POTASSIUM SERPL-SCNC: 4.5 MEQ/L (ref 3.4–4.9)
POTASSIUM SERPL-SCNC: 5.6 MEQ/L (ref 3.4–4.9)
POTASSIUM SERPL-SCNC: 5.9 MEQ/L (ref 3.4–4.9)
POTASSIUM SERPL-SCNC: 6.4 MEQ/L (ref 3.4–4.9)
PRO-BNP: NORMAL PG/ML
PROTEIN UA: >=300 MG/DL
PROTHROMBIN TIME: 14.7 SEC (ref 12.3–14.9)
RBC # BLD: 3.93 M/UL (ref 4.2–5.4)
RBC # BLD: 4.07 M/UL (ref 4.2–5.4)
RBC UA: >100 /HPF (ref 0–5)
RENAL EPITHELIAL, UA: ABNORMAL /HPF
SODIUM BLD-SCNC: 131 MEQ/L (ref 135–144)
SODIUM BLD-SCNC: 132 MEQ/L (ref 135–144)
SODIUM BLD-SCNC: 134 MEQ/L (ref 135–144)
SODIUM BLD-SCNC: 138 MEQ/L (ref 135–144)
SODIUM BLD-SCNC: 141 MEQ/L (ref 135–144)
SODIUM BLD-SCNC: 143 MEQ/L (ref 135–144)
SPECIFIC GRAVITY UA: 1.01 (ref 1–1.03)
TOTAL CK: 108 U/L (ref 0–170)
TOTAL PROTEIN: 6.6 G/DL (ref 6.3–8)
TOTAL PROTEIN: 6.7 G/DL (ref 6.3–8)
TOTAL PROTEIN: 6.9 G/DL (ref 6.3–8)
TRIGL SERPL-MCNC: 127 MG/DL (ref 0–150)
TROPONIN: 0.1 NG/ML (ref 0–0.01)
TSH SERPL DL<=0.05 MIU/L-ACNC: 3.45 UIU/ML (ref 0.44–3.86)
URINE CULTURE, ROUTINE: NORMAL
URINE REFLEX TO CULTURE: YES
UROBILINOGEN, URINE: 0.2 E.U./DL
VITAMIN B-12: 1079 PG/ML (ref 232–1245)
WBC # BLD: 13 K/UL (ref 4.8–10.8)
WBC # BLD: 6.8 K/UL (ref 4.8–10.8)
WBC UA: ABNORMAL /HPF (ref 0–5)

## 2020-01-01 PROCEDURE — 2500000003 HC RX 250 WO HCPCS: Performed by: EMERGENCY MEDICINE

## 2020-01-01 PROCEDURE — 80053 COMPREHEN METABOLIC PANEL: CPT

## 2020-01-01 PROCEDURE — 2580000003 HC RX 258: Performed by: INTERNAL MEDICINE

## 2020-01-01 PROCEDURE — 6360000002 HC RX W HCPCS: Performed by: INTERNAL MEDICINE

## 2020-01-01 PROCEDURE — 99213 OFFICE O/P EST LOW 20 MIN: CPT | Performed by: FAMILY MEDICINE

## 2020-01-01 PROCEDURE — 71045 X-RAY EXAM CHEST 1 VIEW: CPT

## 2020-01-01 PROCEDURE — 83605 ASSAY OF LACTIC ACID: CPT

## 2020-01-01 PROCEDURE — 76770 US EXAM ABDO BACK WALL COMP: CPT

## 2020-01-01 PROCEDURE — 70551 MRI BRAIN STEM W/O DYE: CPT

## 2020-01-01 PROCEDURE — 74176 CT ABD & PELVIS W/O CONTRAST: CPT

## 2020-01-01 PROCEDURE — 76705 ECHO EXAM OF ABDOMEN: CPT

## 2020-01-01 PROCEDURE — 83880 ASSAY OF NATRIURETIC PEPTIDE: CPT

## 2020-01-01 PROCEDURE — 2500000003 HC RX 250 WO HCPCS: Performed by: INTERNAL MEDICINE

## 2020-01-01 PROCEDURE — 99214 OFFICE O/P EST MOD 30 MIN: CPT | Performed by: FAMILY MEDICINE

## 2020-01-01 PROCEDURE — 93010 ELECTROCARDIOGRAM REPORT: CPT | Performed by: INTERNAL MEDICINE

## 2020-01-01 PROCEDURE — 73590 X-RAY EXAM OF LOWER LEG: CPT

## 2020-01-01 PROCEDURE — 36415 COLL VENOUS BLD VENIPUNCTURE: CPT

## 2020-01-01 PROCEDURE — 99285 EMERGENCY DEPT VISIT HI MDM: CPT

## 2020-01-01 PROCEDURE — 1210000000 HC MED SURG R&B

## 2020-01-01 PROCEDURE — 85025 COMPLETE CBC W/AUTO DIFF WBC: CPT

## 2020-01-01 PROCEDURE — 83735 ASSAY OF MAGNESIUM: CPT

## 2020-01-01 PROCEDURE — 99442 PR PHYS/QHP TELEPHONE EVALUATION 11-20 MIN: CPT | Performed by: FAMILY MEDICINE

## 2020-01-01 PROCEDURE — 70450 CT HEAD/BRAIN W/O DYE: CPT

## 2020-01-01 PROCEDURE — 85610 PROTHROMBIN TIME: CPT

## 2020-01-01 PROCEDURE — 2580000003 HC RX 258: Performed by: EMERGENCY MEDICINE

## 2020-01-01 PROCEDURE — 87086 URINE CULTURE/COLONY COUNT: CPT

## 2020-01-01 PROCEDURE — 81001 URINALYSIS AUTO W/SCOPE: CPT

## 2020-01-01 PROCEDURE — 6370000000 HC RX 637 (ALT 250 FOR IP): Performed by: EMERGENCY MEDICINE

## 2020-01-01 PROCEDURE — 80048 BASIC METABOLIC PNL TOTAL CA: CPT

## 2020-01-01 PROCEDURE — 51702 INSERT TEMP BLADDER CATH: CPT

## 2020-01-01 PROCEDURE — 73110 X-RAY EXAM OF WRIST: CPT

## 2020-01-01 PROCEDURE — 82550 ASSAY OF CK (CPK): CPT

## 2020-01-01 PROCEDURE — 72170 X-RAY EXAM OF PELVIS: CPT

## 2020-01-01 PROCEDURE — 84484 ASSAY OF TROPONIN QUANT: CPT

## 2020-01-01 PROCEDURE — 96374 THER/PROPH/DIAG INJ IV PUSH: CPT

## 2020-01-01 PROCEDURE — 93005 ELECTROCARDIOGRAM TRACING: CPT | Performed by: EMERGENCY MEDICINE

## 2020-01-01 PROCEDURE — 99223 1ST HOSP IP/OBS HIGH 75: CPT | Performed by: UROLOGY

## 2020-01-01 PROCEDURE — 73610 X-RAY EXAM OF ANKLE: CPT

## 2020-01-01 PROCEDURE — 96375 TX/PRO/DX INJ NEW DRUG ADDON: CPT

## 2020-01-01 PROCEDURE — 51701 INSERT BLADDER CATHETER: CPT

## 2020-01-01 RX ORDER — HEPARIN SODIUM 5000 [USP'U]/ML
5000 INJECTION, SOLUTION INTRAVENOUS; SUBCUTANEOUS EVERY 8 HOURS SCHEDULED
Status: DISCONTINUED | OUTPATIENT
Start: 2020-01-01 | End: 2020-01-01 | Stop reason: HOSPADM

## 2020-01-01 RX ORDER — DEXTROSE MONOHYDRATE 25 G/50ML
25 INJECTION, SOLUTION INTRAVENOUS ONCE
Status: COMPLETED | OUTPATIENT
Start: 2020-01-01 | End: 2020-01-01

## 2020-01-01 RX ORDER — SODIUM CHLORIDE 9 MG/ML
INJECTION, SOLUTION INTRAVENOUS CONTINUOUS
Status: DISCONTINUED | OUTPATIENT
Start: 2020-01-01 | End: 2020-01-01

## 2020-01-01 RX ORDER — HALOPERIDOL 5 MG/ML
2 INJECTION INTRAMUSCULAR ONCE
Status: COMPLETED | OUTPATIENT
Start: 2020-01-01 | End: 2020-01-01

## 2020-01-01 RX ORDER — NITROFURANTOIN 25; 75 MG/1; MG/1
100 CAPSULE ORAL 2 TIMES DAILY
Qty: 14 CAPSULE | Refills: 0 | Status: SHIPPED | OUTPATIENT
Start: 2020-01-01 | End: 2020-01-01

## 2020-01-01 RX ORDER — ACETAMINOPHEN 650 MG/1
650 SUPPOSITORY RECTAL EVERY 4 HOURS PRN
Status: CANCELLED | OUTPATIENT
Start: 2020-01-01

## 2020-01-01 RX ORDER — HYDRALAZINE HYDROCHLORIDE 20 MG/ML
20 INJECTION INTRAMUSCULAR; INTRAVENOUS EVERY 4 HOURS PRN
Status: DISCONTINUED | OUTPATIENT
Start: 2020-01-01 | End: 2020-01-01 | Stop reason: HOSPADM

## 2020-01-01 RX ORDER — POLYETHYLENE GLYCOL 3350 17 G/17G
17 POWDER, FOR SOLUTION ORAL DAILY PRN
Status: DISCONTINUED | OUTPATIENT
Start: 2020-01-01 | End: 2020-01-01 | Stop reason: HOSPADM

## 2020-01-01 RX ORDER — BISACODYL 10 MG
10 SUPPOSITORY, RECTAL RECTAL
Status: CANCELLED | OUTPATIENT
Start: 2020-01-01

## 2020-01-01 RX ORDER — METOPROLOL SUCCINATE 50 MG/1
TABLET, EXTENDED RELEASE ORAL
Qty: 90 TABLET | Refills: 1 | Status: ON HOLD | OUTPATIENT
Start: 2020-01-01 | End: 2020-01-01 | Stop reason: HOSPADM

## 2020-01-01 RX ORDER — SODIUM CHLORIDE 0.9 % (FLUSH) 0.9 %
10 SYRINGE (ML) INJECTION EVERY 12 HOURS SCHEDULED
Status: DISCONTINUED | OUTPATIENT
Start: 2020-01-01 | End: 2020-01-01 | Stop reason: HOSPADM

## 2020-01-01 RX ORDER — HYDRALAZINE HYDROCHLORIDE 20 MG/ML
10 INJECTION INTRAMUSCULAR; INTRAVENOUS EVERY 4 HOURS PRN
Status: DISCONTINUED | OUTPATIENT
Start: 2020-01-01 | End: 2020-01-01 | Stop reason: HOSPADM

## 2020-01-01 RX ORDER — ACETAMINOPHEN 80 MG
TABLET,CHEWABLE ORAL ONCE
Status: DISCONTINUED | OUTPATIENT
Start: 2020-01-01 | End: 2020-01-01 | Stop reason: HOSPADM

## 2020-01-01 RX ORDER — TRAMADOL HYDROCHLORIDE 50 MG/1
25 TABLET ORAL EVERY 12 HOURS PRN
Status: CANCELLED | OUTPATIENT
Start: 2020-01-01

## 2020-01-01 RX ORDER — SODIUM CHLORIDE 0.9 % (FLUSH) 0.9 %
10 SYRINGE (ML) INJECTION PRN
Status: DISCONTINUED | OUTPATIENT
Start: 2020-01-01 | End: 2020-01-01 | Stop reason: HOSPADM

## 2020-01-01 RX ORDER — PROMETHAZINE HYDROCHLORIDE 12.5 MG/1
12.5 TABLET ORAL EVERY 6 HOURS PRN
Status: DISCONTINUED | OUTPATIENT
Start: 2020-01-01 | End: 2020-01-01 | Stop reason: HOSPADM

## 2020-01-01 RX ORDER — ONDANSETRON 2 MG/ML
4 INJECTION INTRAMUSCULAR; INTRAVENOUS EVERY 6 HOURS PRN
Status: CANCELLED | OUTPATIENT
Start: 2020-01-01

## 2020-01-01 RX ORDER — PROMETHAZINE HYDROCHLORIDE 12.5 MG/1
12.5 TABLET ORAL EVERY 6 HOURS PRN
Status: CANCELLED | OUTPATIENT
Start: 2020-01-01

## 2020-01-01 RX ORDER — ACETAMINOPHEN 325 MG/1
650 TABLET ORAL EVERY 6 HOURS PRN
Status: DISCONTINUED | OUTPATIENT
Start: 2020-01-01 | End: 2020-01-01 | Stop reason: HOSPADM

## 2020-01-01 RX ORDER — CALCIUM CHLORIDE 100 MG/ML
1 INJECTION INTRAVENOUS; INTRAVENTRICULAR ONCE
Status: COMPLETED | OUTPATIENT
Start: 2020-01-01 | End: 2020-01-01

## 2020-01-01 RX ORDER — TRAMADOL HYDROCHLORIDE 50 MG/1
25 TABLET ORAL EVERY 12 HOURS PRN
Status: DISCONTINUED | OUTPATIENT
Start: 2020-01-01 | End: 2020-01-01 | Stop reason: HOSPADM

## 2020-01-01 RX ORDER — HALOPERIDOL 0.5 MG/1
0.5 TABLET ORAL EVERY 6 HOURS PRN
Status: CANCELLED | OUTPATIENT
Start: 2020-01-01

## 2020-01-01 RX ORDER — METOPROLOL SUCCINATE 50 MG/1
TABLET, EXTENDED RELEASE ORAL
Qty: 90 TABLET | Refills: 1 | Status: SHIPPED | OUTPATIENT
Start: 2020-01-01 | End: 2020-01-01 | Stop reason: SDUPTHER

## 2020-01-01 RX ORDER — LABETALOL 20 MG/4 ML (5 MG/ML) INTRAVENOUS SYRINGE
10 ONCE
Status: COMPLETED | OUTPATIENT
Start: 2020-01-01 | End: 2020-01-01

## 2020-01-01 RX ORDER — ACETAMINOPHEN 650 MG/1
650 SUPPOSITORY RECTAL EVERY 6 HOURS PRN
Status: DISCONTINUED | OUTPATIENT
Start: 2020-01-01 | End: 2020-01-01 | Stop reason: HOSPADM

## 2020-01-01 RX ORDER — TRAMADOL HYDROCHLORIDE 50 MG/1
50 TABLET ORAL EVERY 6 HOURS PRN
Status: CANCELLED | OUTPATIENT
Start: 2020-01-01

## 2020-01-01 RX ORDER — 0.9 % SODIUM CHLORIDE 0.9 %
1000 INTRAVENOUS SOLUTION INTRAVENOUS ONCE
Status: COMPLETED | OUTPATIENT
Start: 2020-01-01 | End: 2020-01-01

## 2020-01-01 RX ORDER — ACETAMINOPHEN 325 MG/1
650 TABLET ORAL EVERY 4 HOURS PRN
Status: CANCELLED | OUTPATIENT
Start: 2020-01-01

## 2020-01-01 RX ORDER — ONDANSETRON 2 MG/ML
4 INJECTION INTRAMUSCULAR; INTRAVENOUS EVERY 6 HOURS PRN
Status: DISCONTINUED | OUTPATIENT
Start: 2020-01-01 | End: 2020-01-01 | Stop reason: HOSPADM

## 2020-01-01 RX ADMIN — HYDRALAZINE HYDROCHLORIDE 10 MG: 20 INJECTION INTRAMUSCULAR; INTRAVENOUS at 09:18

## 2020-01-01 RX ADMIN — LABETALOL 20 MG/4 ML (5 MG/ML) INTRAVENOUS SYRINGE 10 MG: at 20:06

## 2020-01-01 RX ADMIN — CALCIUM CHLORIDE INJECTION 1 G: 100 INJECTION, SOLUTION INTRAVENOUS at 13:32

## 2020-01-01 RX ADMIN — HYDRALAZINE HYDROCHLORIDE 20 MG: 20 INJECTION INTRAMUSCULAR; INTRAVENOUS at 00:11

## 2020-01-01 RX ADMIN — Medication 10 ML: at 20:07

## 2020-01-01 RX ADMIN — SODIUM CHLORIDE: 9 INJECTION, SOLUTION INTRAVENOUS at 05:03

## 2020-01-01 RX ADMIN — HEPARIN SODIUM 5000 UNITS: 5000 INJECTION INTRAVENOUS; SUBCUTANEOUS at 05:02

## 2020-01-01 RX ADMIN — SODIUM CHLORIDE 1000 ML: 9 INJECTION, SOLUTION INTRAVENOUS at 14:11

## 2020-01-01 RX ADMIN — Medication 10 ML: at 10:11

## 2020-01-01 RX ADMIN — HEPARIN SODIUM 5000 UNITS: 5000 INJECTION INTRAVENOUS; SUBCUTANEOUS at 22:09

## 2020-01-01 RX ADMIN — DEXTROSE 50 % IN WATER (D50W) INTRAVENOUS SYRINGE 25 G: at 14:15

## 2020-01-01 RX ADMIN — SODIUM BICARBONATE 50 MEQ: 84 INJECTION, SOLUTION INTRAVENOUS at 13:45

## 2020-01-01 RX ADMIN — SODIUM BICARBONATE 50 MEQ: 84 INJECTION INTRAVENOUS at 14:13

## 2020-01-01 RX ADMIN — HALOPERIDOL LACTATE 2 MG: 5 INJECTION INTRAMUSCULAR at 09:31

## 2020-01-01 RX ADMIN — CALCIUM CHLORIDE 1 G: 100 INJECTION INTRAVENOUS; INTRAVENTRICULAR at 14:12

## 2020-01-01 RX ADMIN — SODIUM CHLORIDE: 9 INJECTION, SOLUTION INTRAVENOUS at 18:17

## 2020-01-01 RX ADMIN — INSULIN HUMAN 10 UNITS: 100 INJECTION, SOLUTION PARENTERAL at 14:32

## 2020-01-01 ASSESSMENT — ENCOUNTER SYMPTOMS
SHORTNESS OF BREATH: 0
SHORTNESS OF BREATH: 0
SHORTNESS OF BREATH: 1
ABDOMINAL PAIN: 0
ABDOMINAL PAIN: 0
BACK PAIN: 1
SHORTNESS OF BREATH: 0
SHORTNESS OF BREATH: 0
ABDOMINAL PAIN: 1
ABDOMINAL PAIN: 0

## 2020-01-01 ASSESSMENT — PATIENT HEALTH QUESTIONNAIRE - PHQ9
SUM OF ALL RESPONSES TO PHQ9 QUESTIONS 1 & 2: 0
SUM OF ALL RESPONSES TO PHQ QUESTIONS 1-9: 0
SUM OF ALL RESPONSES TO PHQ QUESTIONS 1-9: 0
2. FEELING DOWN, DEPRESSED OR HOPELESS: 0
1. LITTLE INTEREST OR PLEASURE IN DOING THINGS: 0

## 2020-01-01 ASSESSMENT — PAIN SCALES - WONG BAKER
WONGBAKER_NUMERICALRESPONSE: 0
WONGBAKER_NUMERICALRESPONSE: 4
WONGBAKER_NUMERICALRESPONSE: 0;4
WONGBAKER_NUMERICALRESPONSE: 4
WONGBAKER_NUMERICALRESPONSE: 0
WONGBAKER_NUMERICALRESPONSE: 0

## 2020-01-21 PROBLEM — M06.9 RHEUMATOID ARTHRITIS (HCC): Status: ACTIVE | Noted: 2020-01-01

## 2020-02-25 NOTE — PROGRESS NOTES
Subjective:      Patient ID: Alka Toro is a 80 y.o. female    Hypertension   This is a chronic problem. The current episode started more than 1 year ago. The problem is controlled. Associated symptoms include anxiety. Pertinent negatives include no chest pain or shortness of breath. Risk factors for coronary artery disease include stress and sedentary lifestyle. Past treatments include beta blockers. The current treatment provides mild improvement. Neurologic Problem   The patient's pertinent negatives include no weakness. Pertinent negatives include no chest pain, dizziness or shortness of breath. Here in follow up from recent mri testing of brain and memory loss. No changes other than did have constipation earlier in month which has resolved. Daughter feels like anxiety being created by memory changes. Review of Systems   Constitutional: Negative for activity change and unexpected weight change. Respiratory: Negative for shortness of breath. Cardiovascular: Negative for chest pain. Skin: Negative for rash. Neurological: Negative for dizziness and weakness. Reviewed allergy, medical, social, surgical, family and med list changes and updated   Files--reviewed brain mri of severe atrophy and small vessel ischemic changes and u/s abdomen stable and blood work stable      Social History     Socioeconomic History    Marital status:       Spouse name: None    Number of children: None    Years of education: None    Highest education level: None   Occupational History    None   Social Needs    Financial resource strain: None    Food insecurity:     Worry: None     Inability: None    Transportation needs:     Medical: None     Non-medical: None   Tobacco Use    Smoking status: Never Smoker    Smokeless tobacco: Never Used   Substance and Sexual Activity    Alcohol use: Yes     Comment: SOCIALLY    Drug use: No    Sexual activity: Never   Lifestyle    Physical activity: carotid bruits. No masses. No adenopathy. No thyroid asymmetry. Lungs:clear and equal breath sounds. No wheezes or rales. Heart:rate reg. 1/6 syst murmur across precordium . No gallops   Pulses:Radials 2+ equal               Poster tib just palpable and equal   Extremities: trace edema of both legs   Gen: In no acute distress  Abdomen; B.S present. Soft  Non tender. No hepatosplenomegaly. No masses   Assessment:       Diagnosis Orders   1. Essential hypertension     2. Pure hypercholesterolemia     3. Elevated liver enzymes     4.  Memory changes           Plan:      Orders Placed This Encounter   Medications    metoprolol succinate (TOPROL XL) 50 MG extended release tablet     Sig: TAKE 1 TABLET DAILY     Dispense:  90 tablet     Refill:  1   non fasting blood work in 6 months and f/u after above -after discussion with family, would like to hold on neurology referral or trial of medication for memory at this point

## 2020-02-28 NOTE — PROGRESS NOTES
Subjective:      Patient ID: Vel Sarkar is a 80 y.o. female    HPI  Here with family. Had complained about more back pain and hip pain earlier in week. Patient denies any pain currently. No abdominal pain. Appetite down. No fever. No cold symptoms. Continues with memory issues according to family. No cough or cold symptoms. No abdominal pain. No nausea or emesis. No dysuria. Review of Systems   Constitutional: Negative for chills and unexpected weight change. Respiratory: Negative for shortness of breath. Gastrointestinal: Negative for abdominal pain. Skin: Negative for rash. Neurological: Negative for dizziness and weakness. Reviewed allergy, medical, social, surgical, family and med list changes and updated   Files     Social History     Socioeconomic History    Marital status:       Spouse name: None    Number of children: None    Years of education: None    Highest education level: None   Occupational History    None   Social Needs    Financial resource strain: None    Food insecurity:     Worry: None     Inability: None    Transportation needs:     Medical: None     Non-medical: None   Tobacco Use    Smoking status: Never Smoker    Smokeless tobacco: Never Used   Substance and Sexual Activity    Alcohol use: Yes     Comment: SOCIALLY    Drug use: No    Sexual activity: Never   Lifestyle    Physical activity:     Days per week: None     Minutes per session: None    Stress: None   Relationships    Social connections:     Talks on phone: None     Gets together: None     Attends [employer] service: None     Active member of club or organization: None     Attends meetings of clubs or organizations: None     Relationship status: None    Intimate partner violence:     Fear of current or ex partner: None     Emotionally abused: None     Physically abused: None     Forced sexual activity: None   Other Topics Concern    None   Social History Narrative    None Current Outpatient Medications   Medication Sig Dispense Refill    metoprolol succinate (TOPROL XL) 50 MG extended release tablet TAKE 1 TABLET DAILY 90 tablet 1    traMADol (ULTRAM) 50 MG tablet Take 1 tablet by mouth daily as needed for Pain for up to 30 days. Take1/2 or 1 tab a day as needed for pain. 30 tablet 0    Handicap Placard MISC by Does not apply route Duration of 2 years  Dx: Osteoarthritis 1 each 0    Multiple Vitamins-Minerals (MULTIVITAMIN WOMEN PO) Take by mouth daily       acetaminophen (TYLENOL) 500 MG tablet Take 500 mg by mouth every 6 hours as needed for Pain      ketoconazole (NIZORAL) 2 % cream APPLY TO AFFECTED AREA AS DIRECTED  1    diclofenac sodium 1 % GEL Apply 2 g topically 4 times daily To affected knee      Emollient (CERAVE) CREA Apply topically 2 times daily      Wheat Dextrin (BENEFIBER PO) Take  by mouth daily. 1 tsp        No current facility-administered medications for this visit. History reviewed. No pertinent family history. Past Medical History:   Diagnosis Date    Cancer ([facility] [address]     H/O SKIN,TWO REMOVED FROM NOSE,MOLES ALL OVER BODY    Elevated liver enzymes     History of    Hypertension     Osteoarthritis      Objective:   /80   Pulse 78   Temp 98.4 °F (36.9 °C)   Resp 14   Ht 4' 10\" (1.473 m)   Wt 82 lb (37.2 kg)   LMP  (LMP Unknown)   SpO2 94%   BMI 17.14 kg/m²     Physical Exam    Lungs:clear and equal breath sounds. No wheezes or rales. Heart:rate reg. 1/6 syst murmur across precordium . No gallops     Extremities:no edema in either leg. No irritability to external and internal rotations of both hips. Full rom of knees. Gen:   In no acute distress  Abdomen; B.S present. Soft  Non tender. No hepatosplenomegaly. No masses   Neuro; Able to walk in exam room without cane-slightly unsteady but baseline according to family . Assessment:       Diagnosis Orders   1.  Rheumatoid arthritis, involving unspecified site, unspecified rheumatoid factor presence (HCC)           Plan:      Would closely observe over the week end watch for any other sign of fever or infection  Or changes with pain  Can f/u as needed as family would would like to avoid any further work up for now  F/u per maintenance exam other wise

## 2020-04-08 NOTE — PROGRESS NOTES
Subjective:      Patient ID: Romana Pérez is a 80 y.o. female    Fall   The accident occurred 12 to 24 hours ago. The fall occurred from a bed. The symptoms are aggravated by ambulation, standing, use of injured limb and movement. Associated symptoms include abdominal pain. Pertinent negatives include no fever or numbness. Leg Pain    Pertinent negatives include no numbness. Wrist Pain    Pertinent negatives include no fever or numbness. Here with family and agency care giver. Patient patient poor historian secondary to dementia. Apparently feel out of bed last night at some point. Family has noted that she has some right lower leg and ankle pain and swelling and right wrist pain and swelling this am  Not able to bear weight without pain which seems to be of leg and ankle rather than right hip which she also complains of slight pain there as well. No definite loc or head injury. No sob. No chest pain    No headache. Review of Systems   Constitutional: Positive for activity change. Negative for fever and unexpected weight change. Respiratory: Negative for shortness of breath. Cardiovascular: Positive for leg swelling. Gastrointestinal: Positive for abdominal pain. Musculoskeletal: Positive for arthralgias, gait problem and joint swelling. Skin: Negative for wound. Neurological: Positive for weakness. Negative for dizziness and numbness. Reviewed allergy, medical, social, surgical, family and med list changes and updated   Files     Social History     Socioeconomic History    Marital status:       Spouse name: None    Number of children: None    Years of education: None    Highest education level: None   Occupational History    None   Social Needs    Financial resource strain: None    Food insecurity     Worry: None     Inability: None    Transportation needs     Medical: None     Non-medical: None   Tobacco Use    Smoking status: Never Smoker    Smokeless tobacco: Never Used   Substance and Sexual Activity    Alcohol use: Yes     Comment: SOCIALLY    Drug use: No    Sexual activity: Never   Lifestyle    Physical activity     Days per week: None     Minutes per session: None    Stress: None   Relationships    Social connections     Talks on phone: None     Gets together: None     Attends [employer] service: None     Active member of club or organization: None     Attends meetings of clubs or organizations: None     Relationship status: None    Intimate partner violence     Fear of current or ex partner: None     Emotionally abused: None     Physically abused: None     Forced sexual activity: None   Other Topics Concern    None   Social History Narrative    None     Current Outpatient Medications   Medication Sig Dispense Refill    nitrofurantoin, macrocrystal-monohydrate, (MACROBID) 100 MG capsule Take 1 capsule by mouth 2 times daily for 7 days 14 capsule 0    traMADol (ULTRAM) 50 MG tablet Take 1 tablet by mouth daily as needed for Pain for up to 30 days. 30 tablet 0    metoprolol succinate (TOPROL XL) 50 MG extended release tablet TAKE 1 TABLET DAILY 90 tablet 1    Handicap Placard MISC by Does not apply route Duration of 2 years  Dx: Osteoarthritis 1 each 0    Multiple Vitamins-Minerals (MULTIVITAMIN WOMEN PO) Take by mouth daily       acetaminophen (TYLENOL) 500 MG tablet Take 500 mg by mouth every 6 hours as needed for Pain      ketoconazole (NIZORAL) 2 % cream APPLY TO AFFECTED AREA AS DIRECTED  1    diclofenac sodium 1 % GEL Apply 2 g topically 4 times daily To affected knee      Emollient (CERAVE) CREA Apply topically 2 times daily      Wheat Dextrin (BENEFIBER PO) Take  by mouth daily. 1 tsp        No current facility-administered medications for this visit. History reviewed. No pertinent family history.   Past Medical History:   Diagnosis Date    Cancer ([facility] [address]     H/O SKIN,TWO REMOVED FROM NOSE,MOLES ALL OVER BODY    Elevated liver enzymes Standing Status:   Future     Standing Expiration Date:   4/8/2021    XR TIBIA FIBULA RIGHT (2 VIEWS)     Standing Status:   Future     Standing Expiration Date:   4/8/2021    XR ANKLE RIGHT (MIN 3 VIEWS)     Standing Status:   Future     Standing Expiration Date:   4/8/2021   f/u dependent on xrays

## 2020-04-15 PROBLEM — N17.9 AKI (ACUTE KIDNEY INJURY) (HCC): Status: ACTIVE | Noted: 2020-01-01

## 2020-04-15 NOTE — H&P
Hannah Matt  -----------------------------------------------------------------   Xr Chest (single View Frontal)    Result Date: 4/15/2020  EXAMINATION: XR CHEST (SINGLE VIEW FRONTAL) CLINICAL HISTORY: CHEST PAIN COMPARISONS: None available. FINDINGS: Patient leaning to right, and rotated to right. Diffuse osteopenia. Cardiopericardial silhouette is enlarged. Aorta tortuous. Right diaphragm elevated. Horizontal strand-like opacity identified extending from right infrahilar region laterally to the right chest wall. Increased opacity also identified between left chest wall and left cardiopericardial silhouette. LIMITED STUDY. FINDINGS SUGGESTIVE OF CARDIOMEGALY WITH BILATERAL SCARRING VERSUS PARAHILAR MASS/MALIGNANCY/LYMPH NODE ENLARGEMENT. NO INFLAMMATORY/INFECTIOUS ETIOLOGY CANNOT BE EXCLUDED. NO PRIOR STUDIES. CLINICAL CORRELATION REQUIRED. IF CLINICALLY INDICATED, CT OF THE CHEST MAY BE UTILIZED FOR FURTHER EVALUATION. Xr Wrist Right (min 3 Views)    Result Date: 4/15/2020  EXAMINATION: XR WRIST RIGHT (MIN 3 VIEWS) CLINICAL HISTORY: 80year-old with history of fall who presents with right wrist pain. She had prior displaced right wrist fracture. COMPARISONS: Right wrist plain film 8/3/2018 FINDINGS: 3 views the right wrist were obtained. There is chronic deformity of the distal right radius in this patient with a history of impacted angulated fracture of the distal radial metaphysis. A lucent fracture line is not appreciated suggesting chronic fracture deformity. However visualized bones of the right hand and wrist are severely osteopenic. It would be difficult to exclude a acute superimposed nondisplaced fracture. There is marked osteoarthrosis of the wrist and hand. Second and third metacarpal phalangeal joints are subluxed anterolaterally and there are periarticular osseous degenerative cystic changes. There is dorsal rotatory subluxation of the lunate suggesting DVT type instability.  There is erosive

## 2020-04-15 NOTE — FLOWSHEET NOTE
Skin assessment completed with abbie MACKENZIE , patient has small scratch on her nose and a stage 2 approx 1 1/2 in long and 0.5cm wide

## 2020-04-15 NOTE — ED TRIAGE NOTES
Patient arrived to ED via EMS with C/O constipation and rectal bleed per family. Family told EMS that patient has been constipated x3days. Family attempted to administer an enema today, patient now has rectal bleeding per family. On exam with Dr Fransico Reyna, no bleeding seen.

## 2020-04-15 NOTE — ED PROVIDER NOTES
[address] [facility] ED  eMERGENCY dEPARTMENTe[facility]      Pt Name: Nancy Jauregui  MRN: [id number]  Armsvalerigffiorella 3/14/1921  Date ofevaluation: 4/15/2020  Provider: Maddie Duran DO    CHIEF COMPLAINT       Chief Complaint   Patient presents with    Constipation     x3 days         HISTORY OF PRESENT ILLNESS   (Location/Symptom, Timing/Onset,Context/Setting, Quality, Duration, Modifying Factors, Severity)  Note limiting factors. Nancy Jauregui is a 80 y.o. female who presents to the emergency department . Patient was brought in for altered mental status. Patient is 80years old and lives alone. Her 2 daughters have been sleeping at her house for the last week because she has not been doing well. On April 2 she was having some hematuria and she was put on Macrobid in case she had a urinary tract infection. Family thought she might be somewhat constipated also. Patient stopped eating very well about a week ago because she was getting abdominal pain. They were not sure if it was related to constipation or the Macrobid. She was able to walk until yesterday. Today she is very weak and much less verbal.  No recent falls. Patient does have dementia. Caregiver did give her an enema today and then noticed some blood. HPI    NursingNotes were reviewed. REVIEW OF SYSTEMS    (2-9 systems for level 4, 10 or more for level 5)     Review of Systems   Unable to perform ROS: Dementia       Except as noted above the remainder of the review of systems was reviewed and negative. PAST MEDICAL HISTORY     Past Medical History:   Diagnosis Date    Cancer ([address]     H/O SKIN,TWO REMOVED FROM NOSE,MOLES ALL OVER BODY    Elevated liver enzymes     History of    Hypertension     Osteoarthritis          SURGICALHISTORY       Past Surgical History:   Procedure Laterality Date    CATARACT REMOVAL WITH IMPLANT  8/18/14    DR. MATT    CATARACT REMOVAL WITH IMPLANT  9/10/14     DR. MATT LEFT    FRACTURE SURGERY 2008    LEFT WRIST-REDUCTION    TONSILLECTOMY           CURRENT MEDICATIONS       Previous Medications    ACETAMINOPHEN (TYLENOL) 500 MG TABLET    Take 500 mg by mouth every 6 hours as needed for Pain    DICLOFENAC SODIUM 1 % GEL    Apply 2 g topically 4 times daily To affected knee    EMOLLIENT (CERAVE) CREA    Apply topically 2 times daily    HANDICAP PLACARD MISC    by Does not apply route Duration of 2 years  Dx: Osteoarthritis    KETOCONAZOLE (NIZORAL) 2 % CREAM    APPLY TO AFFECTED AREA AS DIRECTED    METOPROLOL SUCCINATE (TOPROL XL) 50 MG EXTENDED RELEASE TABLET    TAKE 1 TABLET DAILY    MULTIPLE VITAMINS-MINERALS (MULTIVITAMIN WOMEN PO)    Take by mouth daily     TRAMADOL (ULTRAM) 50 MG TABLET    Take 1 tablet by mouth daily as needed for Pain for up to 30 days. WHEAT DEXTRIN (BENEFIBER PO)    Take  by mouth daily. 1 tsp        ALLERGIES     Norvasc [amlodipine] and Univasc [moexipril]    FAMILY HISTORY     History reviewed. No pertinent family history. SOCIAL HISTORY       Social History     Socioeconomic History    Marital status:       Spouse name: None    Number of children: None    Years of education: None    Highest education level: None   Occupational History    None   Social Needs    Financial resource strain: None    Food insecurity     Worry: None     Inability: None    Transportation needs     Medical: None     Non-medical: None   Tobacco Use    Smoking status: Never Smoker    Smokeless tobacco: Never Used   Substance and Sexual Activity    Alcohol use: Yes     Comment: SOCIALLY    Drug use: No    Sexual activity: Never   Lifestyle    Physical activity     Days per week: None     Minutes per session: None    Stress: None   Relationships    Social connections     Talks on phone: None     Gets together: None     Attends Restorationist service: None     Active member of club or organization: None     Attends meetings of clubs or organizations: None     Relationship status:

## 2020-04-15 NOTE — ED NOTES
Attempted to straight cath patient. No urine returned via tube. Placement was accurate. Only a few drops of a red blood like fluid could be seen in the tubing of the catheter. Dr Capri Hinson notified.       Aysha Kerns RN  04/15/20 0896

## 2020-04-15 NOTE — TELEPHONE ENCOUNTER
Daughter is calling and stating that her mother is not doing very well. Patient had constipation the last few days and they did an enema which after  That the aide states patient had a large amount of blood in toilet. Daughter thought patient should be seen due to blood in toilet but while talking she states patient is not able to ambulate. She thinks she may have had a TIA last week. I offered the patient to be seen here in the office today, daughter is not sure that she can move patient on her own. I suggested to call 911 if she felt unable to move mother safely. She is calling 911.       henri

## 2020-04-15 NOTE — ED NOTES
Report called to Na MACKENZIE on [address] placed on patient.       Elida Gooden RN  04/15/20 9752

## 2020-04-16 NOTE — PROGRESS NOTES
position). Vital signs: (most recent): Blood pressure (!) 119/90, pulse 84, temperature 97.5 °F (36.4 °C), temperature source Oral, resp. rate 16, height 4' 10\" (1.473 m), weight 80 lb (36.3 kg), SpO2 96 %, not currently breastfeeding. Vital signs are normal.    Output: Urine output assessment: Landaverde catheter with blood-tinged small amount of urine. HEENT: Normal HEENT exam.    Lungs:  Normal effort. There are decreased breath sounds. Heart: Normal rate. Abdomen: Abdomen is soft. Bowel sounds are normal.     Extremities: (Patient lying down her side in a fetal position)  Pulses: There are decreased pulses. Neurological: (Sleeping hard to arouse). Pupils:  Pupils are equal, round, and reactive to light. Skin:  Warm. Assessment:    Condition: Unchanged. (Patient most likely with acute kidney injury associated with hyperkalemia and metabolic acidosis secondary to severe dehydration. Gross hematuria is worrisome, possible bladder lesion). Plan:   (Agree with IV fluids at 125 cc an hour  Baseline BNP  Daily BMP  Renal and bladder sonogram  Urology consult  We will follow with you).        Avni Ware MD  4/16/2020

## 2020-04-16 NOTE — PROGRESS NOTES
bright red blood, minimal output; Dr. Jazmin Trejo is aware. Will monitor    0540: Patient has been confused and trying to get out of bed. Patient's daughter, Sy Kumar, was called to talk to the patient in efforts to calm the patient. Patient talked to her daughter. Maryloukong Kumar was also updated on garza placement, hematuria, urology consults, and upcoming testing. Daughter explained that the patient saw Dr. Rodolfo Shepherd years ago for hematuria but was never able to complete the consult. Sy Kumar would like kept up to date on imaging results and discharge planning.      2122: Dr. Nicol Sutherland notified of critical creat and BUN

## 2020-04-16 NOTE — PLAN OF CARE
sensory misperception frequency  Outcome: Ongoing  Goal: Able to refrain from responding to false sensory perceptions  Description: Able to refrain from responding to false sensory perceptions  Outcome: Ongoing  Goal: Demonstrates accurate environmental perceptions  Description: Demonstrates accurate environmental perceptions  Outcome: Ongoing  Goal: Able to distinguish between reality-based and nonreality-based thinking  Description: Able to distinguish between reality-based and nonreality-based thinking  Outcome: Ongoing  Goal: Able to interrupt nonreality-based thinking  Description: Able to interrupt nonreality-based thinking  Outcome: Ongoing     Problem: Sleep Pattern Disturbance:  Goal: Appears well-rested  Description: Appears well-rested  Outcome: Ongoing     Problem: Pain:  Goal: Pain level will decrease  Description: Pain level will decrease  Outcome: Ongoing  Goal: Control of acute pain  Description: Control of acute pain  Outcome: Ongoing  Goal: Control of chronic pain  Description: Control of chronic pain  Outcome: Ongoing

## 2020-04-16 NOTE — PROGRESS NOTES
I reviewed the U/S report and CT on PACS personally and with the patient's daughter, Veronica Sarkar, over the phone (there is bilateral hydronephrosis and there are no obstructing stones, the cause of hydronephrosis is not clear but could be due to RP adenopathy vs a bladder based problem such as bladder tumor). She still has minimal bloody urine output and the bladder is not distended. It is not known how long she may have had the ARF as well, as last Creat was on 2/5/20. I also again discussed the case in detail with Dr Lupillo Ocampo, who also talked with the daughter a few hours ago. I had a long conversation with the daughter that the next steps, if aggressive management is desired, would be attempt to correct the electrolyte abnormalities and/or possible dialysis in order to consider general anesthesia for cystoscopy and possible stents vs placement of bilateral percutaneous nephrostomy tubes. I discussed the possible risks and benefits of all these options in detail and she wants to think over what we discussed but is leaning toward no further intervention and may want to consider hospice care. All questions were answered in detail. Dr Lupillo Ocampo will follow-up with the daughter to make a final decision.

## 2020-04-17 PROBLEM — E44.0 MODERATE MALNUTRITION (HCC): Status: ACTIVE | Noted: 2020-01-01

## 2020-04-17 NOTE — PROGRESS NOTES
Dr. Harper Brain notified via perfect serve about critical lab values.   Creatinine 10.31  Potassium 6.4  Awaiting orders.  Electronically signed by Zoraida Cr RN on 4/17/2020 at 6:19 AM

## 2020-04-17 NOTE — PROGRESS NOTES
hospice, hospice meeting scheduled for around noon. Obstructive uropathy risks likely outweigh benefits. Medically, dialysis poses significant risks in this 80 pound female who is at high risk of suffering an acute event with the fluid shifts characteristic of dialysis.     35 minutes total care time, >1/2 in unit/floor time and care coordination     Electronically signed by Dinesh Conway MD on 4/17/2020 at 4:27 PM

## 2020-04-17 NOTE — PROGRESS NOTES
Unable to assess cognition and orientation due to patients disease process ( dementia). Patient opens her eyes to voice and agitation. Patients has inspiratory fine crackles and Rhochi. Patient on a 50% Venturi mask, stating above 90%, patient removes the mask often and when its removed she states mid 80's. Patients respirations are labored and tachypneic. Patient heart sounds are normal, ST on tele. Bowel sounds present in all four quadrants, abdomen is soft and non tender. Landaverde catheter in place, small amount of bloody red fluid in bag. 2010: pt's blood pressure 209/108 and heart rate 108; gave labetalol. 2130: blood pressure 188/84 and heart rate 97. Spoke to Dr Miguel A Burris about blood pressure and heart rate, PRN hydrALAZINE ordered. 0002: blood pressure 201/90 and heart rate 103, PRN medication given, see EMAR.   0031: Blood pressure 161/65 and heart rate 106. 0130: spoke with Dr Marcia Agosto, at bedside, see his note.      Patient resting in bed with eyes closed, will continue to monitor Electronically signed by Benjamin Mtz RN on 4/17/2020 at 1:53 AM

## 2020-04-17 NOTE — DISCHARGE SUMMARY
ASSESSMENT. ADDITIONAL CHRONIC CHANGES  AS DETAILED ABOVE     Xr Tibia Fibula Right (2 Views)    Result Date: 4/15/2020  EXAM:  RIGHT TIBIA AND FIBULA, TWO VIEWS: REASON FOR EXAMINATION: Pain after a fall. TECHNIQUE: Frontal and lateral views of the tibia and fibula obtained. FINDINGS: Decreased bone mineralization. No acute fracture or dislocation. Degenerative changes of the knee noted. Vascular calcifications noted. No radiopaque soft tissue foreign body. No acute osseous abnormality. Xr Ankle Right (min 3 Views)    Result Date: 4/15/2020  EXAMINATION: XR ANKLE RIGHT (MIN 3 VIEWS) CLINICAL HISTORY: PAIN IN RIGHT LEG AFTER FALL. COMPARISONS: None available. FINDINGS: Ankle mortise intact. Osteopenia. Spurring plantar surface calcaneus. No fracture, dislocation, bone lesion. NO ACUTE FINDINGS. DEGENERATIVE CHANGE DISCUSSED. Ct Head Wo Contrast    Result Date: 4/15/2020  EXAMINATION: UNENHANCED CT BRAIN CLINICAL HISTORY: 80year-old with altered mental status TECHNIQUE: Serial axial images were obtained from the skull base to vertex. Sagittal coronal reformats were also obtained. Study is somewhat, right due to motion artifact. All CT scans at this facility use dose modulation, iterative reconstruction, and/or weight based dosing when appropriate to reduce radiation dose to as low as reasonably achievable. FINDINGS: Ventricles, sulci, basilar cisterns are prominent suggesting diffuse cerebral atrophy. There are patchy areas of low-attenuation within the periventricular white matter, nonspecific finding most often seen with small vessel ischemia. There does  appear to be preservation the gray-white junction. No intracranial hemorrhage, mass effect or abnormal extra-axial fluid collections. Visualized paranasal sinuses and mastoid air cells are clear. There are carotid siphon calcifications.  Note is made of bilateral thinning of the parietal bones (biparietal osteodystrophy) typically an incidental finding. ATROPHY, NONSPECIFIC WHITE MATTER CHANGES, AND AGE-RELATED CHANGES. NO CT SIGNS OF ACUTE INTRACRANIAL ABNORMALITY    Us Retroperitoneal Complete    Result Date: 2020  Patient MRN: [id number] : 3/14/1921 Age:  80 years Gender: Female Order Date: 2020 6:15 AM. Exam: US RETROPERITONEAL COMPLETE Number of Views: 43 Indication:  Acute kidney injury, hematuria Comparison: Retroperitoneal ultrasound 2017. CT of the abdomen and pelvis 2017 Findings: Right kidney measures 9.8 x 4.5 x 4.7 cm. Left kidney measures 9.3 x 4.1 x 4.3 cm. Moderately severe to severe right hydronephrosis. No right renal calculi or solid mass. Moderate left hydronephrosis. Left renal calculus identified. Multifocal areas of echogenic material within the bladder, limited in evaluation. Impression:  1. Limited evaluation as described by the ultrasound technician despite patient's best efforts throughout the exam. Post void evaluation not able to be performed. Please see ultrasound technician note. 2. Moderately severe to severe right hydronephrosis with moderate left hydronephrosis or left renal calculus. 3. Multifocal areas of echogenic material within the bladder, limited in evaluation, findings could be reflective of age-indeterminate blood products. A bladder mass or malignancy is not excluded based on this exam.      Discharge Medications: There are no discharge medications for this patient. Disposition:   Hospice    Activity: activity as tolerated    Total time taken for discharging this patient: 40 minutes. Greater than 70% of time was spent focused exclusively on this patient. Time was taken to review chart, discuss plans with consultants, reconciling medications, discussing plan answering questions with patient.      Signed:  Kolton Valdivia

## 2020-04-17 NOTE — DISCHARGE INSTR - COC
[I.V.:855.8]  Out: 250 [Urine:250]    Safety Concerns:     [address] SHERRY Safety Concerns:243491888}    Impairments/Disabilities:      [address] [facility] Impairments/Disabilities:074463076}    Nutrition Therapy:  Current Nutrition Therapy:   [facility] Diet List:211573253}    Routes of Feeding: {CHP DME Other Feedings:087451046}  Liquids: {Slp liquid thickness:63581}  Daily Fluid Restriction: {CHP DME Yes amt example:262947329}  Last Modified Barium Swallow with Video (Video Swallowing Test): {Done Not Done HLZP:996258959}    Treatments at the Time of Hospital Discharge:   Respiratory Treatments: ***  Oxygen Therapy:  {Therapy; copd oxygen:51672}  Ventilator:    {MH CC Vent KKWE:890255005}    Rehab Therapies: {THERAPEUTIC INTERVENTION:7625089539}  Weight Bearing Status/Restrictions: [facility] Weight Bearin}  Other Medical Equipment (for information only, NOT a DME order):  {EQUIPMENT:280050048}  Other Treatments: ***    Patient's personal belongings (please select all that are sent with patient):  {P DME Belongings:374325793}    RN SIGNATURE:  {Esignature:152832470}    CASE MANAGEMENT/SOCIAL WORK SECTION    Inpatient Status Date: ***    Readmission Risk Assessment Score:  Readmission Risk              Risk of Unplanned Readmission:        12           Discharging to Facility/ Agency   · Name:   · Address:  · Phone:  · Fax:    Dialysis Facility (if applicable)   · Name:  · Address:  · Dialysis Schedule:  · Phone:  · Fax:    / signature: {Esignature:[id number]}    PHYSICIAN SECTION    Prognosis: Poor    Condition at Discharge: Terminal    Rehab Potential (if transferring to Rehab): Poor    Physician Certification: I certify the above information and transfer of Michael Coello  is necessary for the continuing treatment of the diagnosis listed and that she requires Hospice for less 30 days.      Update Admission H&P: No change in H&P    PHYSICIAN SIGNATURE:  Electronically signed by Romina Casey MD on

## 2020-04-24 ENCOUNTER — TELEPHONE (OUTPATIENT)
Dept: FAMILY MEDICINE CLINIC | Age: 85
End: 2020-04-24